# Patient Record
Sex: MALE | Race: BLACK OR AFRICAN AMERICAN | NOT HISPANIC OR LATINO | Employment: FULL TIME | URBAN - METROPOLITAN AREA
[De-identification: names, ages, dates, MRNs, and addresses within clinical notes are randomized per-mention and may not be internally consistent; named-entity substitution may affect disease eponyms.]

---

## 2017-01-05 ENCOUNTER — GENERIC CONVERSION - ENCOUNTER (OUTPATIENT)
Dept: OTHER | Facility: OTHER | Age: 52
End: 2017-01-05

## 2017-03-22 ENCOUNTER — GENERIC CONVERSION - ENCOUNTER (OUTPATIENT)
Dept: OTHER | Facility: OTHER | Age: 52
End: 2017-03-22

## 2017-05-31 ENCOUNTER — GENERIC CONVERSION - ENCOUNTER (OUTPATIENT)
Dept: OTHER | Facility: OTHER | Age: 52
End: 2017-05-31

## 2017-09-13 ENCOUNTER — GENERIC CONVERSION - ENCOUNTER (OUTPATIENT)
Dept: OTHER | Facility: OTHER | Age: 52
End: 2017-09-13

## 2017-10-05 ENCOUNTER — TRANSCRIBE ORDERS (OUTPATIENT)
Dept: ADMINISTRATIVE | Facility: HOSPITAL | Age: 52
End: 2017-10-05

## 2017-10-05 DIAGNOSIS — F42.2 MIXED OBSESSIONAL THOUGHTS AND ACTS: ICD-10-CM

## 2017-10-05 DIAGNOSIS — R51.9 NONINTRACTABLE HEADACHE, UNSPECIFIED CHRONICITY PATTERN, UNSPECIFIED HEADACHE TYPE: Primary | ICD-10-CM

## 2017-10-18 ENCOUNTER — HOSPITAL ENCOUNTER (OUTPATIENT)
Dept: RADIOLOGY | Facility: HOSPITAL | Age: 52
Discharge: HOME | End: 2017-10-18
Attending: INTERNAL MEDICINE
Payer: COMMERCIAL

## 2017-10-18 DIAGNOSIS — R51.9 NONINTRACTABLE HEADACHE, UNSPECIFIED CHRONICITY PATTERN, UNSPECIFIED HEADACHE TYPE: ICD-10-CM

## 2017-10-18 DIAGNOSIS — F42.2 MIXED OBSESSIONAL THOUGHTS AND ACTS: ICD-10-CM

## 2017-10-18 PROCEDURE — 70551 MRI BRAIN STEM W/O DYE: CPT

## 2018-11-13 ENCOUNTER — HOSPITAL ENCOUNTER (EMERGENCY)
Facility: HOSPITAL | Age: 53
Discharge: HOME | End: 2018-11-13
Attending: EMERGENCY MEDICINE | Admitting: EMERGENCY MEDICINE
Payer: OTHER MISCELLANEOUS

## 2018-11-13 ENCOUNTER — APPOINTMENT (EMERGENCY)
Dept: RADIOLOGY | Facility: HOSPITAL | Age: 53
End: 2018-11-13
Payer: OTHER MISCELLANEOUS

## 2018-11-13 VITALS
SYSTOLIC BLOOD PRESSURE: 146 MMHG | DIASTOLIC BLOOD PRESSURE: 92 MMHG | HEART RATE: 80 BPM | HEIGHT: 68 IN | RESPIRATION RATE: 18 BRPM | OXYGEN SATURATION: 100 % | TEMPERATURE: 98.8 F | BODY MASS INDEX: 22.73 KG/M2 | WEIGHT: 150 LBS

## 2018-11-13 DIAGNOSIS — S09.90XA HEAD INJURY: Primary | ICD-10-CM

## 2018-11-13 PROCEDURE — 70450 CT HEAD/BRAIN W/O DYE: CPT

## 2018-11-13 PROCEDURE — 99283 EMERGENCY DEPT VISIT LOW MDM: CPT

## 2018-11-13 NOTE — ED PROVIDER NOTES
History  Chief Complaint   Patient presents with    Head Injury     States he was bending over and stood up striking head on towel dispenser at work on 11/11  No LOC  Has headache, did not take Tylenol/Motrin     Patient is a 78-year-old male that is presenting to the emergency room with complaint of intermittent headaches and strange feeling on top of his head, patient was at work with bending over stood up and hit his head on a towel rack 3 days ago, patient denies any loss consciousness at that time, the patient has had no double vision or difficulty walking  Patient has not taken any medications to control his headaches at this point  Patient states he has a family member that had a similar episode in the past that turned out that he had bleeding on his brain  The patient is anxious in because his workman's comp case he decided come to the emergency room and get evaluated  Patient states the pain is more of an ache with some tingling sensations around the scalp  Patient did not have any large hematoma after the injury and there was no lacerations or bleeding            Prior to Admission Medications   Prescriptions Last Dose Informant Patient Reported? Taking? UNKNOWN TO PATIENT  Self Yes Yes   Sig: Thyroid pill and stomach ulcer pill      Facility-Administered Medications: None       Past Medical History:   Diagnosis Date    Disease of thyroid gland     Stomach ulcer        History reviewed  No pertinent surgical history  History reviewed  No pertinent family history  I have reviewed and agree with the history as documented  Social History   Substance Use Topics    Smoking status: Never Smoker    Smokeless tobacco: Never Used    Alcohol use No        Review of Systems   Constitutional: Negative for chills and fever  HENT: Negative for facial swelling and trouble swallowing  Eyes: Negative for photophobia and visual disturbance     Respiratory: Negative for chest tightness and shortness of breath  Cardiovascular: Negative for chest pain and leg swelling  Gastrointestinal: Negative for abdominal pain, nausea and vomiting  Genitourinary: Negative for dysuria and flank pain  Musculoskeletal: Negative for back pain and neck pain  Skin: Negative  Neurological: Positive for headaches  Negative for speech difficulty, weakness and numbness  Hematological: Negative  Psychiatric/Behavioral: Negative  Physical Exam  Physical Exam   Constitutional: He is oriented to person, place, and time  He appears well-developed and well-nourished  HENT:   Head: Normocephalic and atraumatic  Head is without abrasion, without contusion and without laceration  Eyes: Pupils are equal, round, and reactive to light  EOM are normal    Neck: Normal range of motion  Neck supple  Cardiovascular: Normal rate and regular rhythm  Pulmonary/Chest: Effort normal and breath sounds normal    Abdominal: Soft  Bowel sounds are normal  He exhibits no distension  There is no tenderness  Musculoskeletal: Normal range of motion  He exhibits no edema  Neurological: He is alert and oriented to person, place, and time  No cranial nerve deficit  Skin: Skin is warm and dry  Nursing note and vitals reviewed  Vital Signs  ED Triage Vitals [11/13/18 1810]   Temperature Pulse Respirations Blood Pressure SpO2   98 8 °F (37 1 °C) 80 18 146/92 100 %      Temp Source Heart Rate Source Patient Position - Orthostatic VS BP Location FiO2 (%)   Oral Monitor Sitting Left arm --      Pain Score       7           Vitals:    11/13/18 1810   BP: 146/92   Pulse: 80   Patient Position - Orthostatic VS: Sitting       Visual Acuity      ED Medications  Medications - No data to display    Diagnostic Studies  Results Reviewed     None                 CT head without contrast   Final Result by Isaac Cantu DO (11/13 1901)      No acute intracranial abnormality                    Workstation performed: [id number] Procedures  Procedures       Phone Contacts  ED Phone Contact    ED Course                               MDM  Number of Diagnoses or Management Options  Head injury:   Diagnosis management comments: Patient's CT scan showed no acute abnormality, we discussed minor head injuries and the fact that if he has continued symptoms he should follow up with primary care doctor and possibly go see a concussion specialist   Patient states understanding is in agreement the assessment plan  CritCare Time    Disposition  Final diagnoses:   Head injury     Time reflects when diagnosis was documented in both MDM as applicable and the Disposition within this note     Time User Action Codes Description Comment    11/13/2018  7:31 PM Ilia Ruiz Add [S09 [id number]] Head injury       ED Disposition     ED Disposition Condition Comment    Discharge  Lore Fontaine discharge to home/self care  Condition at discharge: Stable        Follow-up Information     Follow up With Specialties Details Why Contact Info    Elvira Castelan MD Internal Medicine Schedule an appointment as soon as possible for a visit As needed [address]  [phone]            Patient's Medications   Discharge Prescriptions    No medications on file     No discharge procedures on file      ED Provider  Electronically Signed by           Soraida Rivera MD  11/13/18 8587

## 2018-11-14 NOTE — DISCHARGE INSTRUCTIONS

## 2019-01-01 ENCOUNTER — APPOINTMENT (EMERGENCY)
Dept: RADIOLOGY | Facility: HOSPITAL | Age: 54
End: 2019-01-01
Payer: COMMERCIAL

## 2019-01-01 ENCOUNTER — HOSPITAL ENCOUNTER (EMERGENCY)
Facility: HOSPITAL | Age: 54
Discharge: HOME | End: 2019-01-01
Attending: EMERGENCY MEDICINE | Admitting: EMERGENCY MEDICINE
Payer: COMMERCIAL

## 2019-01-01 VITALS
TEMPERATURE: 98 F | SYSTOLIC BLOOD PRESSURE: 126 MMHG | HEART RATE: 55 BPM | WEIGHT: 156 LBS | OXYGEN SATURATION: 98 % | BODY MASS INDEX: 23.72 KG/M2 | RESPIRATION RATE: 18 BRPM | DIASTOLIC BLOOD PRESSURE: 78 MMHG

## 2019-01-01 DIAGNOSIS — R07.9 CHEST PAIN: Primary | ICD-10-CM

## 2019-01-01 LAB
ALBUMIN SERPL BCP-MCNC: 3.7 G/DL (ref 3.5–5)
ALP SERPL-CCNC: 80 U/L (ref 46–116)
ALT SERPL W P-5'-P-CCNC: 24 U/L (ref 12–78)
ANION GAP SERPL CALCULATED.3IONS-SCNC: 10 MMOL/L (ref 4–13)
APTT PPP: 31 SECONDS (ref 24–33)
AST SERPL W P-5'-P-CCNC: 25 U/L (ref 5–45)
BASOPHILS # BLD AUTO: 0.04 THOUSANDS/ΜL (ref 0–0.1)
BASOPHILS NFR BLD AUTO: 1 % (ref 0–1)
BILIRUB SERPL-MCNC: 0.3 MG/DL (ref 0.2–1)
BUN SERPL-MCNC: 10 MG/DL (ref 5–25)
CALCIUM SERPL-MCNC: 8.6 MG/DL (ref 8.3–10.1)
CHLORIDE SERPL-SCNC: 103 MMOL/L (ref 100–108)
CO2 SERPL-SCNC: 28 MMOL/L (ref 21–32)
CREAT SERPL-MCNC: 0.91 MG/DL (ref 0.6–1.3)
EOSINOPHIL # BLD AUTO: 0.09 THOUSAND/ΜL (ref 0–0.61)
EOSINOPHIL NFR BLD AUTO: 2 % (ref 0–6)
ERYTHROCYTE [DISTWIDTH] IN BLOOD BY AUTOMATED COUNT: 12.3 % (ref 11.6–15.1)
GFR SERPL CREATININE-BSD FRML MDRD: 109 ML/MIN/1.73SQ M
GLUCOSE SERPL-MCNC: 83 MG/DL (ref 65–140)
HCT VFR BLD AUTO: 39.3 % (ref 36.5–49.3)
HGB BLD-MCNC: 12.9 G/DL (ref 12–17)
IMM GRANULOCYTES # BLD AUTO: 0.01 THOUSAND/UL (ref 0–0.2)
IMM GRANULOCYTES NFR BLD AUTO: 0 % (ref 0–2)
INR PPP: 0.99 (ref 0.86–1.16)
LYMPHOCYTES # BLD AUTO: 1.56 THOUSANDS/ΜL (ref 0.6–4.47)
LYMPHOCYTES NFR BLD AUTO: 30 % (ref 14–44)
MCH RBC QN AUTO: 29.8 PG (ref 26.8–34.3)
MCHC RBC AUTO-ENTMCNC: 32.8 G/DL (ref 31.4–37.4)
MCV RBC AUTO: 91 FL (ref 82–98)
MONOCYTES # BLD AUTO: 0.54 THOUSAND/ΜL (ref 0.17–1.22)
MONOCYTES NFR BLD AUTO: 11 % (ref 4–12)
NEUTROPHILS # BLD AUTO: 2.9 THOUSANDS/ΜL (ref 1.85–7.62)
NEUTS SEG NFR BLD AUTO: 56 % (ref 43–75)
NRBC BLD AUTO-RTO: 0 /100 WBCS
PLATELET # BLD AUTO: 268 THOUSANDS/UL (ref 149–390)
PMV BLD AUTO: 10.5 FL (ref 8.9–12.7)
POTASSIUM SERPL-SCNC: 4 MMOL/L (ref 3.5–5.3)
PROT SERPL-MCNC: 7.2 G/DL (ref 6.4–8.2)
PROTHROMBIN TIME: 10.4 SECONDS (ref 9.4–11.7)
RBC # BLD AUTO: 4.33 MILLION/UL (ref 3.88–5.62)
SODIUM SERPL-SCNC: 141 MMOL/L (ref 136–145)
TROPONIN I SERPL-MCNC: <0.02 NG/ML
TROPONIN I SERPL-MCNC: <0.02 NG/ML
TSH SERPL DL<=0.05 MIU/L-ACNC: 0.8 UIU/ML (ref 0.36–3.74)
WBC # BLD AUTO: 5.14 THOUSAND/UL (ref 4.31–10.16)

## 2019-01-01 PROCEDURE — 84443 ASSAY THYROID STIM HORMONE: CPT | Performed by: EMERGENCY MEDICINE

## 2019-01-01 PROCEDURE — 71045 X-RAY EXAM CHEST 1 VIEW: CPT

## 2019-01-01 PROCEDURE — 85610 PROTHROMBIN TIME: CPT | Performed by: EMERGENCY MEDICINE

## 2019-01-01 PROCEDURE — 36415 COLL VENOUS BLD VENIPUNCTURE: CPT | Performed by: EMERGENCY MEDICINE

## 2019-01-01 PROCEDURE — 99285 EMERGENCY DEPT VISIT HI MDM: CPT

## 2019-01-01 PROCEDURE — 85025 COMPLETE CBC W/AUTO DIFF WBC: CPT | Performed by: EMERGENCY MEDICINE

## 2019-01-01 PROCEDURE — 84484 ASSAY OF TROPONIN QUANT: CPT | Performed by: EMERGENCY MEDICINE

## 2019-01-01 PROCEDURE — 85730 THROMBOPLASTIN TIME PARTIAL: CPT | Performed by: EMERGENCY MEDICINE

## 2019-01-01 PROCEDURE — 93005 ELECTROCARDIOGRAM TRACING: CPT

## 2019-01-01 PROCEDURE — 80053 COMPREHEN METABOLIC PANEL: CPT | Performed by: EMERGENCY MEDICINE

## 2019-01-01 RX ORDER — LEVOTHYROXINE SODIUM 0.1 MG/1
100 TABLET ORAL DAILY
COMMUNITY

## 2019-01-01 RX ADMIN — NITROGLYCERIN 1 INCH: 20 OINTMENT TOPICAL at 16:15

## 2019-01-01 NOTE — DISCHARGE INSTRUCTIONS

## 2019-01-01 NOTE — ED PROVIDER NOTES
History  Chief Complaint   Patient presents with    Chest Pain     left sided chest pain today that comes and goes  Patient states he had some left-sided pain since early this morning  The pain occurred at rest is not associated with movement or position  He has had no cough or congestion  There is no hemoptysis  The chest pain resolved and he was able to go to work  Patient states this afternoon as he was signing at the pain returned in the same area radiating under the left breast   Is not associated with nausea diaphoresis or shortness of breath  Patient states the pain worsened as he was driving home and decided to come get checked out  Patient states as he arrived the pain has been resolving and is almost gone without treatment once again  Patient states he had an EKG in he thinks a stress test about 2 years ago under similar circumstances  Prior to Admission Medications   Prescriptions Last Dose Informant Patient Reported? Taking? UNKNOWN TO PATIENT  Self Yes No   Sig: Thyroid pill and stomach ulcer pill   levothyroxine 100 mcg tablet 1/1/2019 at Unknown time  Yes Yes   Sig: Take 100 mcg by mouth daily      Facility-Administered Medications: None       Past Medical History:   Diagnosis Date    Disease of thyroid gland     Stomach ulcer        History reviewed  No pertinent surgical history  History reviewed  No pertinent family history  I have reviewed and agree with the history as documented  Social History   Substance Use Topics    Smoking status: Never Smoker    Smokeless tobacco: Never Used    Alcohol use No        Review of Systems   Constitutional: Negative for fever  HENT: Negative for congestion and sore throat  Respiratory: Negative for cough, shortness of breath and wheezing  Cardiovascular: Positive for chest pain  Negative for palpitations and leg swelling  Gastrointestinal: Negative for abdominal pain and vomiting     Genitourinary: Negative for dysuria and flank pain  Musculoskeletal: Negative for arthralgias and back pain  Skin: Negative for rash and wound  Neurological: Negative for headaches  Hematological: Does not bruise/bleed easily  Psychiatric/Behavioral: Negative for confusion  All other systems reviewed and are negative  Physical Exam  Physical Exam   Constitutional: He is oriented to person, place, and time  He appears well-developed and well-nourished  HENT:   Head: Normocephalic and atraumatic  Mouth/Throat: Oropharynx is clear and moist    Eyes: Conjunctivae are normal    Patient has Esotropion   Neck: Normal range of motion  Neck supple  Cardiovascular: Normal rate, regular rhythm and normal heart sounds  Pulmonary/Chest: Effort normal and breath sounds normal  He exhibits no tenderness  Abdominal: Soft  Bowel sounds are normal  There is no guarding  Musculoskeletal: Normal range of motion  He exhibits no edema or tenderness  Neurological: He is alert and oriented to person, place, and time  Skin: Skin is warm and dry  Psychiatric: He has a normal mood and affect  His behavior is normal    Nursing note and vitals reviewed        Vital Signs  ED Triage Vitals [01/01/19 1548]   Temperature Pulse Respirations Blood Pressure SpO2   98 °F (36 7 °C) 77 18 162/94 100 %      Temp src Heart Rate Source Patient Position - Orthostatic VS BP Location FiO2 (%)   -- -- -- -- --      Pain Score       --           Vitals:    01/01/19 1548   BP: 162/94   Pulse: 77       Visual Acuity      ED Medications  Medications - No data to display    Diagnostic Studies  Results Reviewed     None                 No orders to display              Procedures  ECG 12 Lead Documentation  Date/Time: 1/1/2019 3:57 PM  Performed by: Beto Broussard  Authorized by: Beto Broussard     Indications / Diagnosis:  Chest pain  ECG reviewed by me, the ED Provider: yes    Patient location:  ED  Interpretation:     Interpretation: non-specific    Rate: ECG rate:  72    ECG rate assessment: normal    Rhythm:     Rhythm: sinus rhythm    Ectopy:     Ectopy: none    QRS:     QRS axis:  Normal    QRS intervals:  Normal  Conduction:     Conduction: normal    ST segments:     ST segments:  Non-specific  T waves:     T waves: inverted      Inverted:  III  Other findings:     Other findings: LVH    Comments:      EKG was repeated and a prior EKG was printed out for comparison  No changes noted           Phone Contacts  ED Phone Contact    ED Course                               [facility]    CritCare Time    Disposition  Final diagnoses:   None     ED Disposition     None      Follow-up Information    None         Patient's Medications   Discharge Prescriptions    No medications on file     No discharge procedures on file      ED Provider  Electronically Signed by           Jacob Galo MD  01/01/19 4256

## 2019-01-02 LAB
ATRIAL RATE: 65 BPM
ATRIAL RATE: 72 BPM
P AXIS: 51 DEGREES
P AXIS: 51 DEGREES
PR INTERVAL: 138 MS
PR INTERVAL: 140 MS
QRS AXIS: -10 DEGREES
QRS AXIS: -5 DEGREES
QRSD INTERVAL: 98 MS
QRSD INTERVAL: 98 MS
QT INTERVAL: 354 MS
QT INTERVAL: 384 MS
QTC INTERVAL: 387 MS
QTC INTERVAL: 399 MS
T WAVE AXIS: 10 DEGREES
T WAVE AXIS: 3 DEGREES
VENTRICULAR RATE: 65 BPM
VENTRICULAR RATE: 72 BPM

## 2019-01-02 PROCEDURE — 93010 ELECTROCARDIOGRAM REPORT: CPT | Performed by: INTERNAL MEDICINE

## 2019-06-18 ENCOUNTER — APPOINTMENT (OUTPATIENT)
Dept: RADIOLOGY | Facility: CLINIC | Age: 54
End: 2019-06-18

## 2019-06-18 DIAGNOSIS — M25.561 ACUTE PAIN OF RIGHT KNEE: ICD-10-CM

## 2019-06-18 PROCEDURE — 73564 X-RAY EXAM KNEE 4 OR MORE: CPT

## 2020-03-23 ENCOUNTER — TELEPHONE (OUTPATIENT)
Dept: FAMILY MEDICINE CLINIC | Facility: CLINIC | Age: 55
End: 2020-03-23

## 2020-03-23 NOTE — TELEPHONE ENCOUNTER
Do you have a fever of 100 5 and above?:no  Do you have a cough?:no  Do you have shortness of breath?  Heavy breath     Have you visited [place]?:no  [place]?:no  [place]?:no  [place]?:no  [place]?:no    Have you visited [place]?:no  [place]?:no  [place]?:no    Have you visited [address] [place] [address] State:?no  [address] [place] [address]?:no  [facility]?:no    Have you been exposed to someone who has been diagnosed with coronavirus?:no    Do you commute daily to and from [place] or [place] for work?:no    Only for std test

## 2023-01-13 ENCOUNTER — OFFICE VISIT (OUTPATIENT)
Dept: FAMILY MEDICINE CLINIC | Facility: CLINIC | Age: 58
End: 2023-01-13

## 2023-01-13 VITALS
RESPIRATION RATE: 16 BRPM | HEIGHT: 66 IN | DIASTOLIC BLOOD PRESSURE: 76 MMHG | WEIGHT: 169 LBS | TEMPERATURE: 97.5 F | SYSTOLIC BLOOD PRESSURE: 130 MMHG | BODY MASS INDEX: 27.16 KG/M2 | OXYGEN SATURATION: 98 % | HEART RATE: 80 BPM

## 2023-01-13 DIAGNOSIS — Z11.59 NEED FOR HEPATITIS C SCREENING TEST: ICD-10-CM

## 2023-01-13 DIAGNOSIS — M75.82 TENDINITIS OF LEFT ROTATOR CUFF: ICD-10-CM

## 2023-01-13 DIAGNOSIS — I10 PRIMARY HYPERTENSION: ICD-10-CM

## 2023-01-13 DIAGNOSIS — E89.0 POSTOPERATIVE HYPOTHYROIDISM: ICD-10-CM

## 2023-01-13 DIAGNOSIS — L20.84 INTRINSIC ECZEMA: ICD-10-CM

## 2023-01-13 DIAGNOSIS — Z12.11 SCREENING FOR COLON CANCER: Primary | ICD-10-CM

## 2023-01-13 DIAGNOSIS — Z00.00 ANNUAL PHYSICAL EXAM: ICD-10-CM

## 2023-01-13 PROBLEM — K21.9 GASTROESOPHAGEAL REFLUX DISEASE WITHOUT ESOPHAGITIS: Status: ACTIVE | Noted: 2023-01-13

## 2023-01-13 RX ORDER — TRIAMCINOLONE ACETONIDE 5 MG/G
CREAM TOPICAL 2 TIMES DAILY
Qty: 30 G | Refills: 0 | Status: SHIPPED | OUTPATIENT
Start: 2023-01-13

## 2023-01-13 RX ORDER — AMLODIPINE BESYLATE 5 MG/1
5 TABLET ORAL DAILY
COMMUNITY
Start: 2022-11-04

## 2023-01-13 RX ORDER — OMEPRAZOLE 40 MG/1
CAPSULE, DELAYED RELEASE ORAL
COMMUNITY
Start: 2022-12-11

## 2023-01-13 NOTE — PROGRESS NOTES
Name: Rafael Veliz      : 1965      MRN: [id number]  Encounter Provider: Shayla Chester MD  Encounter Date: 2023   Encounter department: [address] [place] [address] Dr MEDICINE    Assessment & Plan     1  Screening for colon cancer  -     Ambulatory referral for colonoscopy; Future    2  Need for hepatitis C screening test  -     Hepatitis C Antibody ([facility]); Future    3  Annual physical exam  Assessment & Plan:  Check routine labs      Orders:  -     CBC and differential; Future  -     Comprehensive metabolic panel; Future; Expected date: 2023  -     Lipid panel; Future; Expected date: 2023  -     Urinalysis with microscopic  -     PSA, Total Screen; Future    4  Tendinitis of left rotator cuff  Assessment & Plan:  Already seen [facility] everywhere declined cortisone shot and PT  Will go for PT now    Orders:  -     Ambulatory Referral to Physical Therapy; Future    5  Postoperative hypothyroidism  Assessment & Plan:  Thyroidectomy  hyperactive    Orders:  -     TSH, 3rd generation; Future    6  Primary hypertension  Assessment & Plan:  Well controlled on current therapy continue with current medications and will reassess next visit        7  Intrinsic eczema  Assessment & Plan:  Triamcinolone cream bid  prn    Orders:  -     triamcinolone (KENALOG) 0 5 % cream; Apply topically 2 (two) times a day           Subjective      HPI  New pt here for  Initial visit and due for physical has problems with left  Shoulder pain pt had xray and evaluation  dr Zamora Lot ][facility] declined cortisone shot and therapy  Review of Systems   Constitutional: Negative for appetite change, chills, fatigue and fever  Eyes:        Right eye  Only shadow due to trauma    Respiratory: Negative for cough, chest tightness and shortness of breath  Cardiovascular: Negative for chest pain, palpitations and leg swelling     Gastrointestinal: Negative for abdominal pain, constipation, diarrhea, nausea and vomiting  Genitourinary: Negative for difficulty urinating and frequency  Musculoskeletal: Positive for arthralgias (shoulder left)  Negative for back pain and neck pain  Skin: Positive for rash (sternum area itchy)  Neurological: Negative for dizziness, weakness, light-headedness, numbness and headaches  Hematological: Does not bruise/bleed easily  Psychiatric/Behavioral: Negative for dysphoric mood and sleep disturbance  The patient is not nervous/anxious  Current Outpatient Medications on File Prior to Visit   Medication Sig   • amLODIPine (NORVASC) 5 mg tablet Take 5 mg by mouth daily   • Cholecalciferol 10 MCG (400 UNIT) CAPS Take 400 Units by mouth daily   • levothyroxine 100 mcg tablet Take 100 mcg by mouth daily Is taking 88 mcg daily   • omeprazole (PriLOSEC) 40 MG capsule    • UNKNOWN TO PATIENT Thyroid pill and stomach ulcer pill (Patient not taking: Reported on 1/13/2023)       Objective     /76 (BP Location: Left arm, Patient Position: Sitting, Cuff Size: Standard)   Pulse 80   Temp 97 5 °F (36 4 °C) (Tympanic)   Resp 16   Ht 5' 6" (1 676 m)   Wt 76 7 kg (169 lb)   SpO2 98%   BMI 27 28 kg/m²     Physical Exam  Constitutional:       General: He is not in acute distress  Appearance: Normal appearance  He is not ill-appearing  HENT:      Right Ear: Tympanic membrane and external ear normal       Left Ear: Tympanic membrane and external ear normal       Nose: Nose normal       Mouth/Throat:      Mouth: Mucous membranes are moist    Eyes:      General:         Right eye: No discharge  Left eye: No discharge  Extraocular Movements: Extraocular movements intact  Conjunctiva/sclera: Conjunctivae normal       Pupils: Pupils are equal, round, and reactive to light  Neck:      Thyroid: No thyromegaly  Vascular: No carotid bruit  Trachea: No tracheal deviation  Cardiovascular:      Rate and Rhythm: Normal rate and regular rhythm  Pulses: Normal pulses  Heart sounds: Normal heart sounds  No murmur heard  Pulmonary:      Effort: Pulmonary effort is normal       Breath sounds: Normal breath sounds  No wheezing or rales  Chest:      Chest wall: No tenderness  Breasts:     Right: Normal       Left: Normal    Abdominal:      General: Bowel sounds are normal  There is no distension  Palpations: Abdomen is soft  There is no mass  Tenderness: There is no abdominal tenderness  Hernia: No hernia is present  There is no hernia in the left inguinal area  Genitourinary:     Penis: Normal        Testes: Normal       Prostate: Normal       Rectum: Normal    Musculoskeletal:      Cervical back: Normal range of motion and neck supple  Right lower leg: No edema  Left lower leg: No edema  Comments: Left shoulder decreased ROM    Lymphadenopathy:      Cervical: No cervical adenopathy  Lower Body: No right inguinal adenopathy  No left inguinal adenopathy  Skin:     General: Skin is warm and dry  Findings: Rash (low midsternum hyperpigmented area some flakiness) present  Comments: No abn moles   Neurological:      General: No focal deficit present  Mental Status: He is alert and oriented to person, place, and time  Mental status is at baseline  Cranial Nerves: No cranial nerve deficit  Sensory: No sensory deficit  Motor: No weakness or abnormal muscle tone  Coordination: Coordination normal       Gait: Gait normal       Deep Tendon Reflexes: Reflexes normal    Psychiatric:         Mood and Affect: Mood normal          Behavior: Behavior normal      BMI Counseling: Body mass index is 27 28 kg/m²  The BMI is above normal  Nutrition recommendations include 3-5 servings of fruits/vegetables daily, reducing fast food intake, consuming healthier snacks, decreasing soda and/or juice intake, moderation in carbohydrate intake and increasing intake of lean protein   Exercise recommendations include exercising 3-5 times per week and strength training exercises    Shayla Chester MD

## 2023-01-26 DIAGNOSIS — L20.84 INTRINSIC ECZEMA: ICD-10-CM

## 2023-01-27 RX ORDER — TRIAMCINOLONE ACETONIDE 5 MG/G
CREAM TOPICAL
Qty: 30 G | Refills: 0 | Status: SHIPPED | OUTPATIENT
Start: 2023-01-27

## 2023-01-28 LAB
ALBUMIN SERPL-MCNC: 4.4 G/DL (ref 3.8–4.9)
ALBUMIN/GLOB SERPL: 1.6 {RATIO} (ref 1.2–2.2)
ALP SERPL-CCNC: 80 IU/L (ref 44–121)
ALT SERPL-CCNC: 26 IU/L (ref 0–44)
AST SERPL-CCNC: 27 IU/L (ref 0–40)
BASOPHILS # BLD AUTO: 0.1 X10E3/UL (ref 0–0.2)
BASOPHILS NFR BLD AUTO: 1 %
BILIRUB SERPL-MCNC: 0.3 MG/DL (ref 0–1.2)
BUN SERPL-MCNC: 9 MG/DL (ref 6–24)
BUN/CREAT SERPL: 10 (ref 9–20)
CALCIUM SERPL-MCNC: 9.3 MG/DL (ref 8.7–10.2)
CHLORIDE SERPL-SCNC: 102 MMOL/L (ref 96–106)
CHOLEST SERPL-MCNC: 189 MG/DL (ref 100–199)
CO2 SERPL-SCNC: 28 MMOL/L (ref 20–29)
CREAT SERPL-MCNC: 0.9 MG/DL (ref 0.76–1.27)
EGFR: 98 ML/MIN/1.73
EOSINOPHIL # BLD AUTO: 0.1 X10E3/UL (ref 0–0.4)
EOSINOPHIL NFR BLD AUTO: 2 %
ERYTHROCYTE [DISTWIDTH] IN BLOOD BY AUTOMATED COUNT: 13.7 % (ref 11.6–15.4)
GLOBULIN SER-MCNC: 2.7 G/DL (ref 1.5–4.5)
GLUCOSE SERPL-MCNC: 80 MG/DL (ref 70–99)
HCT VFR BLD AUTO: 38.5 % (ref 37.5–51)
HCV AB S/CO SERPL IA: 0.1 S/CO RATIO (ref 0–0.9)
HDLC SERPL-MCNC: 57 MG/DL
HGB BLD-MCNC: 12.9 G/DL (ref 13–17.7)
IMM GRANULOCYTES # BLD: 0 X10E3/UL (ref 0–0.1)
IMM GRANULOCYTES NFR BLD: 0 %
LDLC SERPL CALC-MCNC: 121 MG/DL (ref 0–99)
LYMPHOCYTES # BLD AUTO: 1.5 X10E3/UL (ref 0.7–3.1)
LYMPHOCYTES NFR BLD AUTO: 33 %
MCH RBC QN AUTO: 29.8 PG (ref 26.6–33)
MCHC RBC AUTO-ENTMCNC: 33.5 G/DL (ref 31.5–35.7)
MCV RBC AUTO: 89 FL (ref 79–97)
MONOCYTES # BLD AUTO: 0.4 X10E3/UL (ref 0.1–0.9)
MONOCYTES NFR BLD AUTO: 9 %
NEUTROPHILS # BLD AUTO: 2.4 X10E3/UL (ref 1.4–7)
NEUTROPHILS NFR BLD AUTO: 55 %
PLATELET # BLD AUTO: 301 X10E3/UL (ref 150–450)
POTASSIUM SERPL-SCNC: 4.4 MMOL/L (ref 3.5–5.2)
PROT SERPL-MCNC: 7.1 G/DL (ref 6–8.5)
PSA SERPL-MCNC: 1.8 NG/ML (ref 0–4)
RBC # BLD AUTO: 4.33 X10E6/UL (ref 4.14–5.8)
SL AMB VLDL CHOLESTEROL CALC: 11 MG/DL (ref 5–40)
SODIUM SERPL-SCNC: 141 MMOL/L (ref 134–144)
TRIGL SERPL-MCNC: 58 MG/DL (ref 0–149)
TSH SERPL DL<=0.005 MIU/L-ACNC: 12.5 UIU/ML (ref 0.45–4.5)
WBC # BLD AUTO: 4.4 X10E3/UL (ref 3.4–10.8)

## 2023-01-31 DIAGNOSIS — D64.9 ANEMIA, UNSPECIFIED TYPE: ICD-10-CM

## 2023-01-31 DIAGNOSIS — E03.8 OTHER SPECIFIED HYPOTHYROIDISM: Primary | ICD-10-CM

## 2023-01-31 RX ORDER — LEVOTHYROXINE SODIUM 0.05 MG/1
50 TABLET ORAL DAILY
Qty: 90 TABLET | Refills: 3 | Status: SHIPPED | OUTPATIENT
Start: 2023-01-31

## 2023-02-02 ENCOUNTER — TELEPHONE (OUTPATIENT)
Dept: OTHER | Facility: OTHER | Age: 58
End: 2023-02-02

## 2023-02-02 NOTE — TELEPHONE ENCOUNTER
Patient reports that he is returning a missed call from [facility]  Reviewed chart and cannot find any information on a missed call but do see a referral was placed in his chart by his PCP on 1/31/2023  Please follow-up with patient; thank you!

## 2023-02-03 NOTE — TELEPHONE ENCOUNTER
There were two referrals from Dr Siva Medrano in his chart, one for colonoscopy which he was overdue from November with Dr Abdirahman Salazar and the other for anemia  Made an appointment with Dr Abdirahman Salazar in case he needs EGD as well as colonoscopy

## 2023-02-06 DIAGNOSIS — I10 PRIMARY HYPERTENSION: Primary | ICD-10-CM

## 2023-02-06 NOTE — TELEPHONE ENCOUNTER
Jaclyn Goodwin said he is "getting the run around from [facility] for refill of blood pressure medication Amlodipine 5mg"  Needs this refilled to [facility]

## 2023-02-07 RX ORDER — AMLODIPINE BESYLATE 5 MG/1
5 TABLET ORAL DAILY
Qty: 30 TABLET | Refills: 2 | Status: SHIPPED | OUTPATIENT
Start: 2023-02-07

## 2023-03-08 ENCOUNTER — TELEPHONE (OUTPATIENT)
Dept: FAMILY MEDICINE CLINIC | Facility: CLINIC | Age: 58
End: 2023-03-08

## 2023-03-08 NOTE — TELEPHONE ENCOUNTER
Patient called to schedule his 4 mon f/u  He also mentioned that he feels like his Iron pill is giving him a reaction  "itching", no rash or other symptoms  He can be reached after 3:30 today

## 2023-03-08 NOTE — TELEPHONE ENCOUNTER
It's possible I guess he was started on some iron secondary to a mild anemia, so it's possible-I guess he can take claritin or benadryl as needed for iitching but may ultimately have to go on a different iron pill or get it in his diet instead Primary Defect Width (In Cm): 0

## 2023-03-09 NOTE — TELEPHONE ENCOUNTER
Patient aware-wants to know what Dr Peri Sapp thinks- he wanted to book an appointment to discuss but nothing soon  Should he take another iron pill?  The itching is coming and going  He is aware he wont get a call back until Dr Peri Sapp gets back on Monday

## 2023-03-16 ENCOUNTER — RA CDI HCC (OUTPATIENT)
Dept: OTHER | Facility: HOSPITAL | Age: 58
End: 2023-03-16

## 2023-03-16 ENCOUNTER — APPOINTMENT (EMERGENCY)
Dept: CT IMAGING | Facility: HOSPITAL | Age: 58
End: 2023-03-16

## 2023-03-16 ENCOUNTER — HOSPITAL ENCOUNTER (EMERGENCY)
Facility: HOSPITAL | Age: 58
Discharge: HOME | End: 2023-03-16
Attending: EMERGENCY MEDICINE

## 2023-03-16 VITALS
SYSTOLIC BLOOD PRESSURE: 153 MMHG | RESPIRATION RATE: 16 BRPM | HEART RATE: 81 BPM | TEMPERATURE: 97.4 F | DIASTOLIC BLOOD PRESSURE: 88 MMHG | OXYGEN SATURATION: 100 %

## 2023-03-16 DIAGNOSIS — S05.02XA ABRASION OF LEFT CORNEA, INITIAL ENCOUNTER: ICD-10-CM
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
## 2023-03-16 DIAGNOSIS — S00.81XA ABRASION OF FACE, INITIAL ENCOUNTER: ICD-10-CM

## 2023-03-16 RX ORDER — CIPROFLOXACIN HYDROCHLORIDE 3.5 MG/ML
2 SOLUTION/ DROPS TOPICAL 4 TIMES DAILY
Qty: 2 ML | Refills: 0 | Status: SHIPPED | OUTPATIENT
Start: 2023-03-16 | End: 2023-03-21

## 2023-03-16 RX ORDER — TETRACAINE HYDROCHLORIDE 5 MG/ML
1 SOLUTION OPHTHALMIC ONCE
Status: COMPLETED | OUTPATIENT
Start: 2023-03-16 | End: 2023-03-16

## 2023-03-16 RX ORDER — CIPROFLOXACIN HYDROCHLORIDE 3.5 MG/ML
2 SOLUTION/ DROPS TOPICAL ONCE
Status: DISCONTINUED | OUTPATIENT
Start: 2023-03-16 | End: 2023-03-16

## 2023-03-16 RX ADMIN — TETRACAINE HYDROCHLORIDE 1 DROP: 5 SOLUTION OPHTHALMIC at 08:46

## 2023-03-16 RX ADMIN — CIPROFLOXACIN HYDROCHLORIDE 1 INCH: 3 OINTMENT OPHTHALMIC at 10:38

## 2023-03-16 RX ADMIN — FLUORESCEIN SODIUM 1 STRIP: 0.6 STRIP OPHTHALMIC at 08:46

## 2023-03-16 NOTE — PROGRESS NOTES
[facility]  coding opportunities       Chart reviewed, no opportunity found: CHART REVIEWED, NO OPPORTUNITY FOUND        Patients Insurance        Commercial Insurance: Jessi Xiao

## 2023-03-16 NOTE — Clinical Note
Grayson Boyce was seen and treated in our emergency department on 3/16/2023  Diagnosis:     Romero Amezquita  may return to work on return date  He may return on this date: 03/17/2023         If you have any questions or concerns, please don't hesitate to call        Sarah Hayes MD    ______________________________           _______________          _______________  Hospital Representative                              Date                                Time
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
## 2023-03-23 ENCOUNTER — TELEPHONE (OUTPATIENT)
Dept: FAMILY MEDICINE CLINIC | Facility: CLINIC | Age: 58
End: 2023-03-23

## 2023-03-23 PROBLEM — D64.9 ANEMIA, MILD: Status: ACTIVE | Noted: 2023-03-23

## 2023-05-03 ENCOUNTER — HOSPITAL ENCOUNTER (EMERGENCY)
Facility: HOSPITAL | Age: 58
Discharge: HOME | End: 2023-05-03
Attending: INTERNAL MEDICINE

## 2023-05-03 ENCOUNTER — APPOINTMENT (EMERGENCY)
Dept: RADIOLOGY | Facility: HOSPITAL | Age: 58
End: 2023-05-03

## 2023-05-03 VITALS
OXYGEN SATURATION: 99 % | SYSTOLIC BLOOD PRESSURE: 143 MMHG | HEART RATE: 98 BPM | DIASTOLIC BLOOD PRESSURE: 93 MMHG | TEMPERATURE: 97.8 F | RESPIRATION RATE: 18 BRPM

## 2023-05-03 DIAGNOSIS — J02.9 SORE THROAT: Primary | ICD-10-CM

## 2023-05-03 DIAGNOSIS — J06.9 URI (UPPER RESPIRATORY INFECTION): ICD-10-CM

## 2023-05-03 LAB
ANION GAP SERPL CALCULATED.3IONS-SCNC: 9 MMOL/L (ref 4–13)
BASOPHILS # BLD AUTO: 0.04 THOUSANDS/ÂΜL (ref 0–0.1)
BASOPHILS NFR BLD AUTO: 1 % (ref 0–1)
BUN SERPL-MCNC: 12 MG/DL (ref 5–25)
CALCIUM SERPL-MCNC: 8.8 MG/DL (ref 8.4–10.2)
CHLORIDE SERPL-SCNC: 102 MMOL/L (ref 96–108)
CO2 SERPL-SCNC: 24 MMOL/L (ref 21–32)
CREAT SERPL-MCNC: 0.86 MG/DL (ref 0.6–1.3)
EOSINOPHIL # BLD AUTO: 0.12 THOUSAND/ÂΜL (ref 0–0.61)
EOSINOPHIL NFR BLD AUTO: 2 % (ref 0–6)
ERYTHROCYTE [DISTWIDTH] IN BLOOD BY AUTOMATED COUNT: 12.4 % (ref 11.6–15.1)
GFR SERPL CREATININE-BSD FRML MDRD: 94 ML/MIN/1.73SQ M
GLUCOSE SERPL-MCNC: 89 MG/DL (ref 65–140)
HCT VFR BLD AUTO: 37.9 % (ref 36.5–49.3)
HGB BLD-MCNC: 12.6 G/DL (ref 12–17)
IMM GRANULOCYTES # BLD AUTO: 0.01 THOUSAND/UL (ref 0–0.2)
IMM GRANULOCYTES NFR BLD AUTO: 0 % (ref 0–2)
LYMPHOCYTES # BLD AUTO: 1.62 THOUSANDS/ÂΜL (ref 0.6–4.47)
LYMPHOCYTES NFR BLD AUTO: 22 % (ref 14–44)
MCH RBC QN AUTO: 30.4 PG (ref 26.8–34.3)
MCHC RBC AUTO-ENTMCNC: 33.2 G/DL (ref 31.4–37.4)
MCV RBC AUTO: 91 FL (ref 82–98)
MONOCYTES # BLD AUTO: 0.8 THOUSAND/ÂΜL (ref 0.17–1.22)
MONOCYTES NFR BLD AUTO: 11 % (ref 4–12)
NEUTROPHILS # BLD AUTO: 4.68 THOUSANDS/ÂΜL (ref 1.85–7.62)
NEUTS SEG NFR BLD AUTO: 64 % (ref 43–75)
NRBC BLD AUTO-RTO: 0 /100 WBCS
PLATELET # BLD AUTO: 281 THOUSANDS/UL (ref 149–390)
PMV BLD AUTO: 10.1 FL (ref 8.9–12.7)
POTASSIUM SERPL-SCNC: 3.5 MMOL/L (ref 3.5–5.3)
RBC # BLD AUTO: 4.15 MILLION/UL (ref 3.88–5.62)
S PYO DNA THROAT QL NAA+PROBE: NOT DETECTED
SODIUM SERPL-SCNC: 135 MMOL/L (ref 135–147)
WBC # BLD AUTO: 7.27 THOUSAND/UL (ref 4.31–10.16)

## 2023-05-04 NOTE — ED PROVIDER NOTES
History  Chief Complaint   Patient presents with    Sore Throat     Pt presents w/ sore throat x 2 days  Pt states today coughed up mucous that contained blood as well as when he blew his nose  Denies active or continuous bleeding  Baby Asprin daily  This is a 59years old came for having sore throat for 2 days  Patient stated that he was blowing his nose and he found blood  At the ER patient has no nasal bleeding  Patient also he is coughing the mucus  And he found blood  Patient has no fever  Patient has no SOB, CP  Patient stated that he drank over juice and he has heartburn so he took 1 tablet of omeprazole  Patient stated that he has history of stomach ulcer  Patient denies history of alcoholism or smoking  Patient came walking to the ER in no distress  Patient was told by his PMD that he has anemia  But he does remember the numbers  On rechecking the computer we find that his last hemoglobin was 12 9  Prior to Admission Medications   Prescriptions Last Dose Informant Patient Reported? Taking? Cholecalciferol 10 MCG (400 UNIT) CAPS   Yes No   Sig: Take 400 Units by mouth daily   UNKNOWN TO PATIENT   Yes No   Sig: Thyroid pill and stomach ulcer pill   Patient not taking: Reported on 1/13/2023   amLODIPine (NORVASC) 5 mg tablet   No No   Sig: Take 1 tablet (5 mg total) by mouth daily   levothyroxine (Euthyrox) 50 mcg tablet   No No   Sig: Take 1 tablet (50 mcg total) by mouth daily   omeprazole (PriLOSEC) 40 MG capsule   Yes No   triamcinolone (KENALOG) 0 5 % cream   No No   Sig: APPLY TO AFFECTED AREA TWICE A DAY      Facility-Administered Medications: None       Past Medical History:   Diagnosis Date    Disease of thyroid gland     Stomach ulcer        Past Surgical History:   Procedure Laterality Date    EYE SURGERY         Family History   Problem Relation Age of Onset    Cancer Mother      I have reviewed and agree with the history as documented      E-Cigarette/Vaping    E-Cigarette Use Never User      E-Cigarette/Vaping Substances     Social History     Tobacco Use    Smoking status: Never    Smokeless tobacco: Never   Vaping Use    Vaping Use: Never used   Substance Use Topics    Alcohol use: Yes     Comment: rarely    Drug use: No       Review of Systems   Constitutional: Negative for diaphoresis, fatigue and fever  HENT: Positive for sore throat  Negative for congestion, dental problem, ear discharge, ear pain, hearing loss, postnasal drip, rhinorrhea, sinus pressure, sinus pain, sneezing, tinnitus and trouble swallowing  Respiratory: Positive for cough  Negative for chest tightness and shortness of breath  Cardiovascular: Negative for chest pain, palpitations and leg swelling  Gastrointestinal: Negative for abdominal pain, diarrhea, nausea and vomiting  Endocrine: Negative for polydipsia, polyphagia and polyuria  Genitourinary: Negative for difficulty urinating, dysuria, flank pain and hematuria  Musculoskeletal: Negative for arthralgias, back pain, gait problem, neck pain and neck stiffness  Skin: Negative for color change, pallor and rash  Neurological: Negative for dizziness, seizures, syncope, speech difficulty, light-headedness and headaches  Hematological: Negative for adenopathy  Does not bruise/bleed easily  Psychiatric/Behavioral: Negative for agitation and behavioral problems  Physical Exam  Physical Exam  Vitals and nursing note reviewed  Constitutional:       General: He is not in acute distress  Appearance: He is well-developed  He is not ill-appearing, toxic-appearing or diaphoretic  HENT:      Head: Normocephalic and atraumatic  Right Ear: Tympanic membrane and ear canal normal       Left Ear: Tympanic membrane and ear canal normal       Nose: No congestion or rhinorrhea  Mouth/Throat:      Mouth: Mucous membranes are moist  No oral lesions        Pharynx: No pharyngeal swelling, oropharyngeal exudate, posterior oropharyngeal erythema or uvula swelling  Tonsils: No tonsillar exudate or tonsillar abscesses  0 on the right  0 on the left  Eyes:      Extraocular Movements:      Right eye: Normal extraocular motion  Left eye: Normal extraocular motion  Conjunctiva/sclera: Conjunctivae normal    Neck:      Thyroid: No thyromegaly  Cardiovascular:      Rate and Rhythm: Normal rate and regular rhythm  Heart sounds: Normal heart sounds  No murmur heard  No friction rub  No gallop  Pulmonary:      Effort: Pulmonary effort is normal  No respiratory distress  Breath sounds: Normal breath sounds  No wheezing, rhonchi or rales  Chest:      Chest wall: No tenderness  Abdominal:      General: Bowel sounds are normal  There is no distension  Palpations: Abdomen is soft  There is no mass  Tenderness: There is no abdominal tenderness  There is no guarding or rebound  Hernia: No hernia is present  Musculoskeletal:         General: No tenderness or deformity  Normal range of motion  Cervical back: Normal range of motion and neck supple  Lymphadenopathy:      Cervical: No cervical adenopathy  Skin:     General: Skin is warm and dry  Capillary Refill: Capillary refill takes less than 2 seconds  Coloration: Skin is not pale  Findings: No erythema or rash  Neurological:      Mental Status: He is alert and oriented to person, place, and time     Psychiatric:         Behavior: Behavior normal          Vital Signs  ED Triage Vitals [05/03/23 1955]   Temperature Pulse Respirations Blood Pressure SpO2   97 8 °F (36 6 °C) 98 18 143/93 99 %      Temp Source Heart Rate Source Patient Position - Orthostatic VS BP Location FiO2 (%)   Oral Monitor Sitting Right arm --      Pain Score       --           Vitals:    05/03/23 1955   BP: 143/93   Pulse: 98   Patient Position - Orthostatic VS: Sitting         Visual Acuity      ED Medications  Medications - No data to display    Diagnostic Studies  Results Reviewed     Procedure Component Value Units Date/Time    Strep A PCR [[id number]]  (Normal) Collected: 05/03/23 2014    Lab Status: Final result Specimen: Throat Updated: 05/03/23 2044     STREP A PCR Not Detected    Basic metabolic panel [[id number]] Collected: 05/03/23 2014    Lab Status: Final result Specimen: Blood from Arm, Right Updated: 05/03/23 2034     Sodium 135 mmol/L      Potassium 3 5 mmol/L      Chloride 102 mmol/L      CO2 24 mmol/L      ANION GAP 9 mmol/L      BUN 12 mg/dL      Creatinine 0 86 mg/dL      Glucose 89 mg/dL      Calcium 8 8 mg/dL      eGFR 94 ml/min/1 73sq m     Narrative:      [place] guidelines for Chronic Kidney Disease (CKD):     Stage 1 with normal or high GFR (GFR > 90 mL/min/1 73 square meters)    Stage 2 Mild CKD (GFR = 60-89 mL/min/1 73 square meters)    Stage 3A Moderate CKD (GFR = 45-59 mL/min/1 73 square meters)    Stage 3B Moderate CKD (GFR = 30-44 mL/min/1 73 square meters)    Stage 4 Severe CKD (GFR = 15-29 mL/min/1 73 square meters)    Stage 5 End Stage CKD (GFR <15 mL/min/1 73 square meters)  Note: GFR calculation is accurate only with a steady state creatinine    CBC and differential [[id number]] Collected: 05/03/23 2014    Lab Status: Final result Specimen: Blood from Arm, Right Updated: 05/03/23 2022     WBC 7 27 Thousand/uL      RBC 4 15 Million/uL      Hemoglobin 12 6 g/dL      Hematocrit 37 9 %      MCV 91 fL      MCH 30 4 pg      MCHC 33 2 g/dL      RDW 12 4 %      MPV 10 1 fL      Platelets 442 Thousands/uL      nRBC 0 /100 WBCs      Neutrophils Relative 64 %      Immat GRANS % 0 %      Lymphocytes Relative 22 %      Monocytes Relative 11 %      Eosinophils Relative 2 %      Basophils Relative 1 %      Neutrophils Absolute 4 68 Thousands/µL      Immature Grans Absolute 0 01 Thousand/uL      Lymphocytes Absolute 1 62 Thousands/µL      Monocytes Absolute 0 80 Thousand/µL      Eosinophils Absolute 0 12 Thousand/µL      Basophils Absolute 0 04 Thousands/µL                  XR chest 1 view portable   ED Interpretation by Jayashree Rivas MD (05/03 2035)   Cxr NO acute cardiopulmonary findings                 Procedures  Procedures         ED Course                               SBIRT 20yo+    Flowsheet Row Most Recent Value   Initial Alcohol Screen: US AUDIT-C     1  How often do you have a drink containing alcohol? 0 Filed at: 05/03/2023 1957   2  How many drinks containing alcohol do you have on a typical day you are drinking? 0 Filed at: 05/03/2023 1957   3a  Male UNDER 65: How often do you have five or more drinks on one occasion? 0 Filed at: 05/03/2023 1957   Audit-C Score 0 Filed at: 05/03/2023 1957   REBECCA: How many times in the past year have you    Used an illegal drug or used a prescription medication for non-medical reasons? Never Filed at: 05/03/2023 1957                    Medical Decision Making  This is a 59years old came for having sore throat for 3 days  Patient has no fever  Patient was blowing his nose and he found blood  Patient also complaining of occasional dry cough  Patient has no history of smoking or drinking alcohol  Physical exam on the patient came back normal   There is no nasal bleeding and the throat is normal and there is no congestion or erythema  Blood test including CBC BMP came back normal   CXR shows no acute cardiopulmonary findings  Patient stated that when he came that he has anemia I told the patient that his hemoglobin is normal   PCR strep is pending but patient wants to go home and is going to check the results on [employer] website  At this time again discharge patient home follow-up with his PMD     Amount and/or Complexity of Data Reviewed  Labs: ordered  Details: WNL  Radiology: ordered and independent interpretation performed       Details: CXR; no acute cardiopulmonary findings          Disposition  Final diagnoses:   Sore throat   URI (upper respiratory infection)     Time reflects when diagnosis was documented in both MDM as applicable and the Disposition within this note     Time User Action Codes Description Comment    5/3/2023  8:38 PM Idella Hashimoto, Moheb Add [J02 9] Sore throat     5/3/2023  8:38 PM Arnulfo Beckford [J06 9] URI (upper respiratory infection)       ED Disposition     ED Disposition   Discharge    Condition   Stable    Date/Time   Wed May 3, 2023  8:42 PM    [address] discharge to home/self care  Follow-up Information     Follow up With Specialties Details Why Contact Info    Noah Aguiar MD Family Medicine In 1 week  [address]  [address]  [id number] [place] [address]  [phone]            Discharge Medication List as of 5/3/2023  8:45 PM      CONTINUE these medications which have NOT CHANGED    Details   amLODIPine (NORVASC) 5 mg tablet Take 1 tablet (5 mg total) by mouth daily, Starting Tue 2/7/2023, Normal      Cholecalciferol 10 MCG (400 UNIT) CAPS Take 400 Units by mouth daily, Historical Med      levothyroxine (Euthyrox) 50 mcg tablet Take 1 tablet (50 mcg total) by mouth daily, Starting Tue 1/31/2023, Normal      omeprazole (PriLOSEC) 40 MG capsule Starting Sun 12/11/2022, Historical Med      triamcinolone (KENALOG) 0 5 % cream APPLY TO AFFECTED AREA TWICE A DAY, Normal      UNKNOWN TO PATIENT Thyroid pill and stomach ulcer pill, Historical Med             No discharge procedures on file      PDMP Review     None          ED Provider  Electronically Signed by           Lori Walters MD  05/03/23 2051

## 2023-05-04 NOTE — ED NOTES
D/c reviewed with pt  Pt verbalized understanding and has no further questions at this time  Pt ambulatory off unit with steady gait       Leonarda You RN  05/03/23 2411

## 2023-05-04 NOTE — DISCHARGE INSTRUCTIONS
Follow up with your PMD  Drink warm beverages   Take tylenol for pain if needed  Check the website [employer] for the result of swab for the strep PCR  Labs Reviewed   STREP A PCR   CBC AND DIFFERENTIAL       Result Value Ref Range Status    WBC 7 27  4 31 - 10 16 Thousand/uL Final    RBC 4 15  3 88 - 5 62 Million/uL Final    Hemoglobin 12 6  12 0 - 17 0 g/dL Final    Hematocrit 37 9  36 5 - 49 3 % Final    MCV 91  82 - 98 fL Final    MCH 30 4  26 8 - 34 3 pg Final    MCHC 33 2  31 4 - 37 4 g/dL Final    RDW 12 4  11 6 - 15 1 % Final    MPV 10 1  8 9 - 12 7 fL Final    Platelets 179  917 - 390 Thousands/uL Final    nRBC 0  /100 WBCs Final    Neutrophils Relative 64  43 - 75 % Final    Immat GRANS % 0  0 - 2 % Final    Lymphocytes Relative 22  14 - 44 % Final    Monocytes Relative 11  4 - 12 % Final    Eosinophils Relative 2  0 - 6 % Final    Basophils Relative 1  0 - 1 % Final    Neutrophils Absolute 4 68  1 85 - 7 62 Thousands/µL Final    Immature Grans Absolute 0 01  0 00 - 0 20 Thousand/uL Final    Lymphocytes Absolute 1 62  0 60 - 4 47 Thousands/µL Final    Monocytes Absolute 0 80  0 17 - 1 22 Thousand/µL Final    Eosinophils Absolute 0 12  0 00 - 0 61 Thousand/µL Final    Basophils Absolute 0 04  0 00 - 0 10 Thousands/µL Final   BASIC METABOLIC PANEL    Sodium 431  135 - 147 mmol/L Final    Potassium 3 5  3 5 - 5 3 mmol/L Final    Chloride 102  96 - 108 mmol/L Final    CO2 24  21 - 32 mmol/L Final    ANION GAP 9  4 - 13 mmol/L Final    BUN 12  5 - 25 mg/dL Final    Creatinine 0 86  0 60 - 1 30 mg/dL Final    Comment: Standardized to IDMS reference method    Glucose 89  65 - 140 mg/dL Final    Comment: If the patient is fasting, the ADA then defines impaired fasting glucose as > 100 mg/dL and diabetes as > or equal to 123 mg/dL      Calcium 8 8  8 4 - 10 2 mg/dL Final    eGFR 94  ml/min/1 73sq m Final    Narrative:     [place] guidelines for Chronic Kidney Disease (CKD):     Stage 1 with normal or high GFR (GFR > 90 mL/min/1 73 square meters)    Stage 2 Mild CKD (GFR = 60-89 mL/min/1 73 square meters)    Stage 3A Moderate CKD (GFR = 45-59 mL/min/1 73 square meters)    Stage 3B Moderate CKD (GFR = 30-44 mL/min/1 73 square meters)    Stage 4 Severe CKD (GFR = 15-29 mL/min/1 73 square meters)    Stage 5 End Stage CKD (GFR <15 mL/min/1 73 square meters)  Note: GFR calculation is accurate only with a steady state creatinine     XR chest 1 view portable   ED Interpretation   Cxr NO acute cardiopulmonary findings

## 2023-08-24 ENCOUNTER — RA CDI HCC (OUTPATIENT)
Dept: OTHER | Facility: HOSPITAL | Age: 58
End: 2023-08-24

## 2023-08-24 NOTE — PROGRESS NOTES
[address] [facility] coding opportunities       Chart reviewed, no opportunity found: CHART REVIEWED, NO OPPORTUNITY FOUND        Patients Insurance        Commercial Insurance: [address]

## 2023-08-29 PROBLEM — Z86.010 HISTORY OF COLON POLYPS: Status: ACTIVE | Noted: 2023-08-29

## 2023-08-29 PROBLEM — E78.5 DYSLIPIDEMIA: Status: ACTIVE | Noted: 2023-08-29

## 2023-08-29 PROBLEM — Z86.0100 HISTORY OF COLON POLYPS: Status: ACTIVE | Noted: 2023-08-29

## 2023-10-13 ENCOUNTER — OFFICE VISIT (OUTPATIENT)
Dept: FAMILY MEDICINE CLINIC | Facility: CLINIC | Age: 58
End: 2023-10-13
Payer: COMMERCIAL

## 2023-10-13 ENCOUNTER — HOSPITAL ENCOUNTER (OUTPATIENT)
Dept: RADIOLOGY | Facility: HOSPITAL | Age: 58
End: 2023-10-13
Payer: COMMERCIAL

## 2023-10-13 VITALS
WEIGHT: 169 LBS | HEART RATE: 86 BPM | BODY MASS INDEX: 27.16 KG/M2 | OXYGEN SATURATION: 99 % | HEIGHT: 66 IN | TEMPERATURE: 98 F | DIASTOLIC BLOOD PRESSURE: 82 MMHG | SYSTOLIC BLOOD PRESSURE: 132 MMHG | RESPIRATION RATE: 16 BRPM

## 2023-10-13 DIAGNOSIS — Z23 ENCOUNTER FOR IMMUNIZATION: ICD-10-CM

## 2023-10-13 DIAGNOSIS — M54.42 ACUTE MIDLINE LOW BACK PAIN WITH LEFT-SIDED SCIATICA: ICD-10-CM

## 2023-10-13 DIAGNOSIS — K21.9 GASTROESOPHAGEAL REFLUX DISEASE WITHOUT ESOPHAGITIS: ICD-10-CM

## 2023-10-13 DIAGNOSIS — E78.5 DYSLIPIDEMIA: ICD-10-CM

## 2023-10-13 DIAGNOSIS — M25.552 LEFT HIP PAIN: ICD-10-CM

## 2023-10-13 DIAGNOSIS — Z86.010 HISTORY OF COLON POLYPS: Primary | ICD-10-CM

## 2023-10-13 DIAGNOSIS — I10 PRIMARY HYPERTENSION: ICD-10-CM

## 2023-10-13 DIAGNOSIS — E89.0 POSTOPERATIVE HYPOTHYROIDISM: ICD-10-CM

## 2023-10-13 PROBLEM — M54.50 LOW BACK PAIN: Status: ACTIVE | Noted: 2023-10-13

## 2023-10-13 PROCEDURE — 73502 X-RAY EXAM HIP UNI 2-3 VIEWS: CPT

## 2023-10-13 PROCEDURE — 90686 IIV4 VACC NO PRSV 0.5 ML IM: CPT | Performed by: FAMILY MEDICINE

## 2023-10-13 PROCEDURE — 90471 IMMUNIZATION ADMIN: CPT | Performed by: FAMILY MEDICINE

## 2023-10-13 PROCEDURE — 99214 OFFICE O/P EST MOD 30 MIN: CPT | Performed by: FAMILY MEDICINE

## 2023-10-13 PROCEDURE — 72110 X-RAY EXAM L-2 SPINE 4/>VWS: CPT

## 2023-10-13 RX ORDER — MELOXICAM 15 MG/1
15 TABLET ORAL DAILY PRN
Qty: 15 TABLET | Refills: 0 | Status: SHIPPED | OUTPATIENT
Start: 2023-10-13

## 2023-10-13 RX ORDER — LEVOTHYROXINE SODIUM 88 UG/1
TABLET ORAL
COMMUNITY
Start: 2023-07-28

## 2023-10-13 RX ORDER — CYCLOBENZAPRINE HCL 10 MG
10 TABLET ORAL
Qty: 15 TABLET | Refills: 0 | Status: SHIPPED | OUTPATIENT
Start: 2023-10-13

## 2023-10-13 NOTE — PROGRESS NOTES
Name: Michelle Meza      : 1965      MRN: [id number]  Encounter Provider: Karla Dillon MD  Encounter Date: 10/13/2023   Encounter department: [address] [place] [address]    Assessment & Plan     1. History of colon polyps  Assessment & Plan:  Due for colonoscopy      Orders:  -     Ambulatory Referral to Gastroenterology; Future    2. Primary hypertension  Assessment & Plan:  Well controlled on current therapy continue with current medications and will reassess next visit        3. Gastroesophageal reflux disease without esophagitis  Assessment & Plan:  Ok on omeprazole    Orders:  -     Ambulatory Referral to Gastroenterology; Future    4. Postoperative hypothyroidism  Assessment & Plan:  Pt is monitored  by dr Sykes   surgeon and TSH is ok       5. Encounter for immunization  -     influenza vaccine, quadrivalent, 0.5 mL, preservative-free, for adult and pediatric patients 6 mos+ (AFLURIA, FLUARIX, FLULAVAL, FLUZONE)    6. Acute midline low back pain with left-sided sciatica  Assessment & Plan:  2 weeks  mid back lumbar radiating to left side and into left hip    Orders:  -     XR spine lumbar minimum 4 views non injury; Future; Expected date: 10/13/2023  -     meloxicam (MOBIC) 15 mg tablet; Take 1 tablet (15 mg total) by mouth daily as needed for moderate pain  -     cyclobenzaprine (FLEXERIL) 10 mg tablet; Take 1 tablet (10 mg total) by mouth daily at bedtime    7. Left hip pain  Assessment & Plan:  Left hip pain radiating to left thigh will check xray     Orders:  -     meloxicam (MOBIC) 15 mg tablet; Take 1 tablet (15 mg total) by mouth daily as needed for moderate pain  -     XR hip/pelv 2-3 vws left if performed; Future; Expected date: 10/13/2023    8. Dyslipidemia  Assessment & Plan:  lDL 121 watch saturated fats /sweets          Depression Screening and Follow-up Plan: Patient was screened for depression during today's encounter.  They screened negative with a PHQ-2 score of 0.        Subjective      Back Pain  Pertinent negatives include no chest pain, headaches or weakness. Hip Pain      pt here with complaints of back pain midline for 2 weeks radiating to left side and into eft hip with some  "tension"  Review of Systems   Respiratory:  Negative for cough, chest tightness and shortness of breath. Cardiovascular:  Negative for chest pain, palpitations and leg swelling. Musculoskeletal:  Positive for arthralgias (left hip) and back pain. Neurological:  Negative for dizziness, weakness, light-headedness and headaches. Psychiatric/Behavioral:  Negative for dysphoric mood. The patient is not nervous/anxious. in left thigh no urinary or bowel complaints     Current Outpatient Medications on File Prior to Visit   Medication Sig   • amLODIPine (NORVASC) 5 mg tablet Take 1 tablet (5 mg total) by mouth daily   • Cholecalciferol 10 MCG (400 UNIT) CAPS Take 400 Units by mouth daily   • levothyroxine 88 mcg tablet    • omeprazole (PriLOSEC) 40 MG capsule    • triamcinolone (KENALOG) 0.5 % cream APPLY TO AFFECTED AREA TWICE A DAY   • [DISCONTINUED] levothyroxine (Euthyrox) 50 mcg tablet Take 1 tablet (50 mcg total) by mouth daily (Patient not taking: Reported on 10/13/2023)   • [DISCONTINUED] UNKNOWN TO PATIENT Thyroid pill and stomach ulcer pill (Patient not taking: Reported on 1/13/2023)       Objective     /82 (BP Location: Left arm, Patient Position: Sitting, Cuff Size: Standard)   Pulse 86   Temp 98 °F (36.7 °C) (Tympanic)   Resp 16   Ht 5' 6" (1.676 m)   Wt 76.7 kg (169 lb)   SpO2 99%   BMI 27.28 kg/m²     Physical Exam  Constitutional:       General: He is not in acute distress. Appearance: Normal appearance. He is well-developed. He is not ill-appearing. Eyes:      Extraocular Movements: Extraocular movements intact. Neck:      Thyroid: No thyromegaly. Cardiovascular:      Rate and Rhythm: Normal rate.    Pulmonary:      Effort: Pulmonary effort is normal. No respiratory distress. Breath sounds: Normal breath sounds. Musculoskeletal:         General: Tenderness (lumbar midback) present. Cervical back: Normal range of motion. Neurological:      General: No focal deficit present. Mental Status: He is alert and oriented to person, place, and time. Mental status is at baseline. Motor: No weakness.       Gait: Gait normal.   Psychiatric:         Mood and Affect: Mood normal.         Behavior: Behavior normal.       Gregg Gomez MD

## 2023-10-20 ENCOUNTER — OFFICE VISIT (OUTPATIENT)
Dept: GASTROENTEROLOGY | Facility: CLINIC | Age: 58
End: 2023-10-20

## 2023-10-20 ENCOUNTER — TELEPHONE (OUTPATIENT)
Dept: GASTROENTEROLOGY | Facility: CLINIC | Age: 58
End: 2023-10-20

## 2023-10-20 VITALS
SYSTOLIC BLOOD PRESSURE: 140 MMHG | HEART RATE: 70 BPM | WEIGHT: 167 LBS | BODY MASS INDEX: 26.84 KG/M2 | DIASTOLIC BLOOD PRESSURE: 98 MMHG | HEIGHT: 66 IN

## 2023-10-20 DIAGNOSIS — K21.9 GASTROESOPHAGEAL REFLUX DISEASE WITHOUT ESOPHAGITIS: ICD-10-CM

## 2023-10-20 DIAGNOSIS — Z86.010 HISTORY OF COLON POLYPS: ICD-10-CM

## 2023-10-20 NOTE — TELEPHONE ENCOUNTER
Scheduled date of EGD/colonoscopy (as of today):11/1/23  Physician performing EGD/colonoscopy:Dr Aylin Zamarripa   Location of EGD/colonoscopy:[facility]  Desired bowel prep reviewed with patient:george   Instructions reviewed with patient by:sb  Clearances:  none

## 2023-10-20 NOTE — H&P (VIEW-ONLY)
[facility] - Outpatient Consultation  Fazal Hernandez 59 y.o. male MRN: [id number]  Encounter: [id number]          ASSESSMENT AND PLAN:      1. History of colon polyps  History of polyps recommended repeat in 5 years last colonoscopy was 6 years ago  - Ambulatory Referral to Gastroenterology  - Colonoscopy; Future    2. Gastroesophageal reflux disease without esophagitis  On chronic PPI last EGD over 6 years ago. He will otherwise follow-up in a year  - Ambulatory Referral to Gastroenterology  - EGD; Future    ______________________________________________________________________    HPI: 78-year-old gentleman seen by Dr. Vicki Marshall has EGD and colonoscopies in the past he is due for EGD colonoscopy due to chronic reflux and history of polyps. Patient has no particular complaints. He does take omeprazole on a daily basis. REVIEW OF SYSTEMS:    CONSTITUTIONAL: Denies any fever, chills, rigors, and weight loss. HEENT: No earache or tinnitus. Denies hearing loss or visual disturbances. CARDIOVASCULAR: No chest pain or palpitations. RESPIRATORY: Denies any cough, hemoptysis, shortness of breath or dyspnea on exertion. GASTROINTESTINAL: As noted in the History of Present Illness. GENITOURINARY: No problems with urination. Denies any hematuria or dysuria. NEUROLOGIC: No dizziness or vertigo, denies headaches. MUSCULOSKELETAL: Denies any muscle or joint pain. SKIN: Denies skin rashes or itching. ENDOCRINE: Denies excessive thirst. Denies intolerance to heat or cold. PSYCHOSOCIAL: Denies depression or anxiety. Denies any recent memory loss.        Historical Information   Past Medical History:   Diagnosis Date    Disease of thyroid gland     Stomach ulcer      Past Surgical History:   Procedure Laterality Date    EYE SURGERY       Social History   Social History     Substance and Sexual Activity   Alcohol Use Yes    Comment: rarely     Social History     Substance and Sexual Activity   Drug Use No     Social History     Tobacco Use   Smoking Status Former    Packs/day: 0.50    Years: 10.00    Total pack years: 5.00    Types: Cigarettes    Quit date: 2013    Years since quitting: 10.8   Smokeless Tobacco Never     Family History   Problem Relation Age of Onset    Cancer Mother        Meds/Allergies       Current Outpatient Medications:     amLODIPine (NORVASC) 5 mg tablet    Cholecalciferol 10 MCG (400 UNIT) CAPS    cyclobenzaprine (FLEXERIL) 10 mg tablet    levothyroxine 88 mcg tablet    meloxicam (MOBIC) 15 mg tablet    omeprazole (PriLOSEC) 40 MG capsule    triamcinolone (KENALOG) 0.5 % cream    No Known Allergies        Objective     Blood pressure 140/98, pulse 70, height 5' 6" (1.676 m), weight 75.8 kg (167 lb). Body mass index is 26.95 kg/m². PHYSICAL EXAM:      General Appearance:   Alert, cooperative, no distress   HEENT:   Normocephalic, atraumatic, anicteric. Neck:  Supple, symmetrical, trachea midline   Lungs:   Clear to auscultation bilaterally; no rales, rhonchi or wheezing; respirations unlabored    Heart[de-identified]   Regular rate and rhythm; no murmur, rub, or gallop. Abdomen:   Soft, non-tender, non-distended; normal bowel sounds; no masses, no organomegaly    Genitalia:   Deferred    Rectal:   Deferred    Extremities:  No cyanosis, clubbing or edema    Pulses:  2+ and symmetric    Skin:  No jaundice, rashes, or lesions    Lymph nodes:  No palpable cervical lymphadenopathy        Lab Results:   No visits with results within 1 Day(s) from this visit.    Latest known visit with results is:   Admission on 05/03/2023, Discharged on 05/03/2023   Component Date Value    STREP A PCR 05/03/2023 Not Detected     WBC 05/03/2023 7.27     RBC 05/03/2023 4.15     Hemoglobin 05/03/2023 12.6     Hematocrit 05/03/2023 37.9     MCV 05/03/2023 91     MCH 05/03/2023 30.4     MCHC 05/03/2023 33.2     RDW 05/03/2023 12.4     MPV 05/03/2023 10.1     Platelets 29/31/4400 281     nRBC 05/03/2023 0     Neutrophils Relative 05/03/2023 64     Immat GRANS % 05/03/2023 0     Lymphocytes Relative 05/03/2023 22     Monocytes Relative 05/03/2023 11     Eosinophils Relative 05/03/2023 2     Basophils Relative 05/03/2023 1     Neutrophils Absolute 05/03/2023 4.68     Immature Grans Absolute 05/03/2023 0.01     Lymphocytes Absolute 05/03/2023 1.62     Monocytes Absolute 05/03/2023 0.80     Eosinophils Absolute 05/03/2023 0.12     Basophils Absolute 05/03/2023 0.04     Sodium 05/03/2023 135     Potassium 05/03/2023 3.5     Chloride 05/03/2023 102     CO2 05/03/2023 24     ANION GAP 05/03/2023 9     BUN 05/03/2023 12     Creatinine 05/03/2023 0.86     Glucose 05/03/2023 89     Calcium 05/03/2023 8.8     eGFR 05/03/2023 94          Radiology Results:   No results found.

## 2023-10-20 NOTE — PROGRESS NOTES
[facility] - Outpatient Consultation  Elvira Dunbar 59 y.o. male MRN: [id number]  Encounter: [id number]          ASSESSMENT AND PLAN:      1. History of colon polyps  History of polyps recommended repeat in 5 years last colonoscopy was 6 years ago  - Ambulatory Referral to Gastroenterology  - Colonoscopy; Future    2. Gastroesophageal reflux disease without esophagitis  On chronic PPI last EGD over 6 years ago. He will otherwise follow-up in a year  - Ambulatory Referral to Gastroenterology  - EGD; Future    ______________________________________________________________________    HPI: 78-year-old gentleman seen by Dr. Greer Record has EGD and colonoscopies in the past he is due for EGD colonoscopy due to chronic reflux and history of polyps. Patient has no particular complaints. He does take omeprazole on a daily basis. REVIEW OF SYSTEMS:    CONSTITUTIONAL: Denies any fever, chills, rigors, and weight loss. HEENT: No earache or tinnitus. Denies hearing loss or visual disturbances. CARDIOVASCULAR: No chest pain or palpitations. RESPIRATORY: Denies any cough, hemoptysis, shortness of breath or dyspnea on exertion. GASTROINTESTINAL: As noted in the History of Present Illness. GENITOURINARY: No problems with urination. Denies any hematuria or dysuria. NEUROLOGIC: No dizziness or vertigo, denies headaches. MUSCULOSKELETAL: Denies any muscle or joint pain. SKIN: Denies skin rashes or itching. ENDOCRINE: Denies excessive thirst. Denies intolerance to heat or cold. PSYCHOSOCIAL: Denies depression or anxiety. Denies any recent memory loss.        Historical Information   Past Medical History:   Diagnosis Date    Disease of thyroid gland     Stomach ulcer      Past Surgical History:   Procedure Laterality Date    EYE SURGERY       Social History   Social History     Substance and Sexual Activity   Alcohol Use Yes    Comment: rarely     Social History     Substance and Sexual Activity   Drug Use No     Social History     Tobacco Use   Smoking Status Former    Packs/day: 0.50    Years: 10.00    Total pack years: 5.00    Types: Cigarettes    Quit date: 2013    Years since quitting: 10.8   Smokeless Tobacco Never     Family History   Problem Relation Age of Onset    Cancer Mother        Meds/Allergies       Current Outpatient Medications:     amLODIPine (NORVASC) 5 mg tablet    Cholecalciferol 10 MCG (400 UNIT) CAPS    cyclobenzaprine (FLEXERIL) 10 mg tablet    levothyroxine 88 mcg tablet    meloxicam (MOBIC) 15 mg tablet    omeprazole (PriLOSEC) 40 MG capsule    triamcinolone (KENALOG) 0.5 % cream    No Known Allergies        Objective     Blood pressure 140/98, pulse 70, height 5' 6" (1.676 m), weight 75.8 kg (167 lb). Body mass index is 26.95 kg/m². PHYSICAL EXAM:      General Appearance:   Alert, cooperative, no distress   HEENT:   Normocephalic, atraumatic, anicteric. Neck:  Supple, symmetrical, trachea midline   Lungs:   Clear to auscultation bilaterally; no rales, rhonchi or wheezing; respirations unlabored    Heart[de-identified]   Regular rate and rhythm; no murmur, rub, or gallop. Abdomen:   Soft, non-tender, non-distended; normal bowel sounds; no masses, no organomegaly    Genitalia:   Deferred    Rectal:   Deferred    Extremities:  No cyanosis, clubbing or edema    Pulses:  2+ and symmetric    Skin:  No jaundice, rashes, or lesions    Lymph nodes:  No palpable cervical lymphadenopathy        Lab Results:   No visits with results within 1 Day(s) from this visit.    Latest known visit with results is:   Admission on 05/03/2023, Discharged on 05/03/2023   Component Date Value    STREP A PCR 05/03/2023 Not Detected     WBC 05/03/2023 7.27     RBC 05/03/2023 4.15     Hemoglobin 05/03/2023 12.6     Hematocrit 05/03/2023 37.9     MCV 05/03/2023 91     MCH 05/03/2023 30.4     MCHC 05/03/2023 33.2     RDW 05/03/2023 12.4     MPV 05/03/2023 10.1     Platelets 96/64/3742 281     nRBC 05/03/2023 0     Neutrophils Relative 05/03/2023 64     Immat GRANS % 05/03/2023 0     Lymphocytes Relative 05/03/2023 22     Monocytes Relative 05/03/2023 11     Eosinophils Relative 05/03/2023 2     Basophils Relative 05/03/2023 1     Neutrophils Absolute 05/03/2023 4.68     Immature Grans Absolute 05/03/2023 0.01     Lymphocytes Absolute 05/03/2023 1.62     Monocytes Absolute 05/03/2023 0.80     Eosinophils Absolute 05/03/2023 0.12     Basophils Absolute 05/03/2023 0.04     Sodium 05/03/2023 135     Potassium 05/03/2023 3.5     Chloride 05/03/2023 102     CO2 05/03/2023 24     ANION GAP 05/03/2023 9     BUN 05/03/2023 12     Creatinine 05/03/2023 0.86     Glucose 05/03/2023 89     Calcium 05/03/2023 8.8     eGFR 05/03/2023 94          Radiology Results:   No results found.

## 2023-10-23 DIAGNOSIS — K21.9 GASTROESOPHAGEAL REFLUX DISEASE WITHOUT ESOPHAGITIS: Primary | ICD-10-CM

## 2023-10-24 RX ORDER — OMEPRAZOLE 40 MG/1
40 CAPSULE, DELAYED RELEASE ORAL DAILY
Qty: 30 CAPSULE | Refills: 1 | Status: SHIPPED | OUTPATIENT
Start: 2023-10-24

## 2023-10-27 ENCOUNTER — ANESTHESIA EVENT (OUTPATIENT)
Dept: ANESTHESIOLOGY | Facility: AMBULATORY SURGERY CENTER | Age: 58
End: 2023-10-27

## 2023-10-27 ENCOUNTER — ANESTHESIA (OUTPATIENT)
Dept: ANESTHESIOLOGY | Facility: AMBULATORY SURGERY CENTER | Age: 58
End: 2023-10-27

## 2023-10-30 ENCOUNTER — TELEPHONE (OUTPATIENT)
Dept: GASTROENTEROLOGY | Facility: AMBULATORY SURGERY CENTER | Age: 58
End: 2023-10-30

## 2023-11-01 ENCOUNTER — ANESTHESIA (OUTPATIENT)
Dept: GASTROENTEROLOGY | Facility: AMBULATORY SURGERY CENTER | Age: 58
End: 2023-11-01

## 2023-11-01 ENCOUNTER — HOSPITAL ENCOUNTER (OUTPATIENT)
Dept: GASTROENTEROLOGY | Facility: AMBULATORY SURGERY CENTER | Age: 58
Discharge: HOME | End: 2023-11-01
Payer: COMMERCIAL

## 2023-11-01 ENCOUNTER — ANESTHESIA EVENT (OUTPATIENT)
Dept: GASTROENTEROLOGY | Facility: AMBULATORY SURGERY CENTER | Age: 58
End: 2023-11-01

## 2023-11-01 VITALS
HEART RATE: 71 BPM | WEIGHT: 158 LBS | OXYGEN SATURATION: 99 % | SYSTOLIC BLOOD PRESSURE: 138 MMHG | BODY MASS INDEX: 25.39 KG/M2 | RESPIRATION RATE: 18 BRPM | HEIGHT: 66 IN | TEMPERATURE: 97 F | DIASTOLIC BLOOD PRESSURE: 88 MMHG

## 2023-11-01 DIAGNOSIS — Z86.010 HISTORY OF COLON POLYPS: ICD-10-CM

## 2023-11-01 DIAGNOSIS — K21.9 GASTROESOPHAGEAL REFLUX DISEASE WITHOUT ESOPHAGITIS: ICD-10-CM

## 2023-11-01 PROCEDURE — 88305 TISSUE EXAM BY PATHOLOGIST: CPT | Performed by: STUDENT IN AN ORGANIZED HEALTH CARE EDUCATION/TRAINING PROGRAM

## 2023-11-01 PROCEDURE — G0121 COLON CA SCRN NOT HI RSK IND: HCPCS | Performed by: INTERNAL MEDICINE

## 2023-11-01 PROCEDURE — 43239 EGD BIOPSY SINGLE/MULTIPLE: CPT | Performed by: INTERNAL MEDICINE

## 2023-11-01 RX ORDER — SODIUM CHLORIDE, SODIUM LACTATE, POTASSIUM CHLORIDE, CALCIUM CHLORIDE 600; 310; 30; 20 MG/100ML; MG/100ML; MG/100ML; MG/100ML
125 INJECTION, SOLUTION INTRAVENOUS CONTINUOUS
Status: CANCELLED | OUTPATIENT
Start: 2023-11-01

## 2023-11-01 RX ORDER — SODIUM CHLORIDE, SODIUM LACTATE, POTASSIUM CHLORIDE, CALCIUM CHLORIDE 600; 310; 30; 20 MG/100ML; MG/100ML; MG/100ML; MG/100ML
125 INJECTION, SOLUTION INTRAVENOUS CONTINUOUS
Status: DISCONTINUED | OUTPATIENT
Start: 2023-11-01 | End: 2023-11-05 | Stop reason: HOSPADM

## 2023-11-01 RX ORDER — PROPOFOL 10 MG/ML
INJECTION, EMULSION INTRAVENOUS AS NEEDED
Status: DISCONTINUED | OUTPATIENT
Start: 2023-11-01 | End: 2023-11-01

## 2023-11-01 RX ADMIN — PROPOFOL 20 MG: 10 INJECTION, EMULSION INTRAVENOUS at 10:40

## 2023-11-01 RX ADMIN — PROPOFOL 40 MG: 10 INJECTION, EMULSION INTRAVENOUS at 10:41

## 2023-11-01 RX ADMIN — SODIUM CHLORIDE, SODIUM LACTATE, POTASSIUM CHLORIDE, CALCIUM CHLORIDE: 600; 310; 30; 20 INJECTION, SOLUTION INTRAVENOUS at 10:32

## 2023-11-01 RX ADMIN — PROPOFOL 150 MG: 10 INJECTION, EMULSION INTRAVENOUS at 10:36

## 2023-11-01 RX ADMIN — PROPOFOL 40 MG: 10 INJECTION, EMULSION INTRAVENOUS at 10:47

## 2023-11-01 RX ADMIN — PROPOFOL 30 MG: 10 INJECTION, EMULSION INTRAVENOUS at 10:53

## 2023-11-01 RX ADMIN — PROPOFOL 30 MG: 10 INJECTION, EMULSION INTRAVENOUS at 10:38

## 2023-11-01 RX ADMIN — PROPOFOL 40 MG: 10 INJECTION, EMULSION INTRAVENOUS at 10:44

## 2023-11-01 RX ADMIN — PROPOFOL 30 MG: 10 INJECTION, EMULSION INTRAVENOUS at 10:50

## 2023-11-01 NOTE — DISCHARGE SUMMARY
Discharge Summary - Danuta Osullivan 59 y.o. male MRN: [id number]    Unit/Bed#:  Encounter: [id number]    Admission Date: 11/1/2023    Admitting Diagnosis: History of colon polyps [Z86.010]  Gastroesophageal reflux disease without esophagitis [K21.9]    HPI: History of colon polyp and reflux    Procedures Performed: No orders of the defined types were placed in this encounter. Summary of Hospital Course: Tolerated procedure well    Significant Findings, Care, Treatment and Services Provided: Significant reflux not seen. There is no evidence of Haro's esophagus. Esophageal biopsies taken. No polyp noted in the colon. Complications: None    Discharge Diagnosis: See above    Medical Problems       Resolved Problems  Date Reviewed: 10/20/2023   None         Condition at Discharge: good         Discharge instructions/Information to patient and family:   See after visit summary for information provided to patient and family. Provisions for Follow-Up Care:  See after visit summary for information related to follow-up care and any pertinent home health orders.       PCP: Mannie Deng MD    Disposition: Home

## 2023-11-01 NOTE — ANESTHESIA POSTPROCEDURE EVALUATION
Post-Op Assessment Note    CV Status:  Stable  Pain Score: 0    Pain management: adequate     Mental Status:  Arousable and sleepy   Hydration Status:  Euvolemic and stable   PONV Controlled:  Controlled   Airway Patency:  Patent and adequate      Post Op Vitals Reviewed: Yes      Staff: CRNA         No notable events documented.     BP   106/67   Temp     Pulse 72   Resp 14   SpO2 97% RA

## 2023-11-01 NOTE — ANESTHESIA PREPROCEDURE EVALUATION
Procedure:  COLONOSCOPY  EGD    Relevant Problems   ANESTHESIA (within normal limits)      CARDIO   (+) Primary hypertension      ENDO   (+) Postoperative hypothyroidism      GI/HEPATIC   (+) Gastroesophageal reflux disease without esophagitis      HEMATOLOGY   (+) Anemia, mild      MUSCULOSKELETAL   (+) Low back pain        Physical Exam    Airway    Mallampati score: III  TM Distance: >3 FB  Neck ROM: full     Dental   No notable dental hx     Cardiovascular      Pulmonary      Other Findings        Anesthesia Plan  ASA Score- 2     Anesthesia Type- IV sedation with anesthesia with ASA Monitors. Additional Monitors:     Airway Plan:            Plan Factors-Exercise tolerance (METS): >4 METS. Chart reviewed. EKG reviewed. Existing labs reviewed. Patient summary reviewed. Patient is not a current smoker. Induction- intravenous. Postoperative Plan-     Informed Consent- Anesthetic plan and risks discussed with patient. I personally reviewed this patient with the CRNA. Discussed and agreed on the Anesthesia Plan with the CRNA. Yovanny Hurd

## 2023-11-01 NOTE — INTERVAL H&P NOTE
H&P reviewed. After examining the patient I find no changes in the patients condition since the H&P had been written.     Vitals:    11/01/23 1003   BP: 139/84   Pulse: 82   Resp: 18   Temp: (!) 97 °F (36.1 °C)   SpO2: 99%

## 2023-11-06 PROCEDURE — 88305 TISSUE EXAM BY PATHOLOGIST: CPT | Performed by: STUDENT IN AN ORGANIZED HEALTH CARE EDUCATION/TRAINING PROGRAM

## 2023-11-09 ENCOUNTER — TELEPHONE (OUTPATIENT)
Dept: GASTROENTEROLOGY | Facility: CLINIC | Age: 58
End: 2023-11-09

## 2023-11-09 NOTE — TELEPHONE ENCOUNTER
Patients GI provider:  Dr. Bethany Llamas     Number to return call: ( [phone]    Reason for call: Pt calling pt called back for results.  Pt scheduled for f/u on     Scheduled procedure/appointment date if applicable: Apt/procedure

## 2023-11-09 NOTE — TELEPHONE ENCOUNTER
----- Message from Benji Batista MD sent at 11/7/2023  2:44 PM EST -----  Esophageal biopsy does not show any inflammation. Continue current regimen.   Office visit in 2 to 3 months

## 2023-11-24 ENCOUNTER — OFFICE VISIT (OUTPATIENT)
Dept: FAMILY MEDICINE CLINIC | Facility: CLINIC | Age: 58
End: 2023-11-24
Payer: COMMERCIAL

## 2023-11-24 VITALS
TEMPERATURE: 97.8 F | HEIGHT: 66 IN | DIASTOLIC BLOOD PRESSURE: 74 MMHG | WEIGHT: 170 LBS | SYSTOLIC BLOOD PRESSURE: 128 MMHG | BODY MASS INDEX: 27.32 KG/M2 | HEART RATE: 76 BPM | RESPIRATION RATE: 16 BRPM | OXYGEN SATURATION: 99 %

## 2023-11-24 DIAGNOSIS — K21.9 GASTROESOPHAGEAL REFLUX DISEASE WITHOUT ESOPHAGITIS: ICD-10-CM

## 2023-11-24 DIAGNOSIS — R10.33 PERIUMBILICAL ABDOMINAL PAIN: Primary | ICD-10-CM

## 2023-11-24 PROCEDURE — 99214 OFFICE O/P EST MOD 30 MIN: CPT

## 2023-11-24 RX ORDER — FAMOTIDINE 20 MG/1
20 TABLET, FILM COATED ORAL 2 TIMES DAILY
Qty: 180 TABLET | Refills: 1 | Status: SHIPPED | OUTPATIENT
Start: 2023-11-24

## 2023-11-24 NOTE — ASSESSMENT & PLAN NOTE
Pain x 2 weeks rates as 4/10, no aggravating factors. On exam mild tenderness in periumbilical region, pain is localized, pt denies N/V, fever endorses reflux symptoms and intermittent abdominal bloating. Discussed poss Gerd vs appendicitis vs hernia will obtain abdominal U/S educated on strict Gerd diet, tylenol prn and must go to ER for worsening sxs.

## 2023-11-24 NOTE — PROGRESS NOTES
Name: Bell Staff      : 1965      MRN: [id number]  Encounter Provider: ANIBAL Jose  Encounter Date: 2023   Encounter department: [facility] [address]    Assessment & Plan     1. Periumbilical abdominal pain  Assessment & Plan:  Pain x 2 weeks rates as 4/10, no aggravating factors. On exam mild tenderness in periumbilical region, pain is localized, pt denies N/V, fever endorses reflux symptoms and intermittent abdominal bloating. Discussed poss Gerd vs appendicitis vs hernia will obtain abdominal U/S educated on strict Gerd diet, tylenol prn and must go to ER for worsening sxs. Orders:  -     US abdomen complete; Future; Expected date: 2023    2. Gastroesophageal reflux disease without esophagitis  Assessment & Plan:  Per recommendation from EGD 10/20/23 discussed weaning PPI, will send script for Famotidine 20 mg bid. Education on [facility] provided on AVS pt will trial taking omeprazole qod as has a lot of medication at home then switch to Famotidine. Orders:  -     famotidine (PEPCID) 20 mg tablet; Take 1 tablet (20 mg total) by mouth 2 (two) times a day        Depression Screening and Follow-up Plan: Patient was screened for depression during today's encounter. They screened negative with a PHQ-2 score of 0. Subjective      Pt presents with c/o intermittent periumbilical pain for the last two weeks was having some groin pain however no longer present. Pt has a lot of bloating pt had EGD/colonoscopy on 10/20/23 which shows mild inflammation of lower esophagus with recommendations to wean PPI. Pt is taking omeprazole 40 mg qd states has been on medication for several years, does not adhere to [facility]. Pt denies N/V has tenderness on palpation of periumbilical region pain does not radiate. Review of Systems   Constitutional:  Negative for chills, fatigue and fever. HENT:  Negative for congestion.     Respiratory:  Negative for cough, chest tightness, shortness of breath and wheezing. Cardiovascular:  Negative for chest pain, palpitations and leg swelling. Gastrointestinal:  Positive for abdominal distention and abdominal pain. Negative for anal bleeding, blood in stool, constipation, diarrhea, nausea, rectal pain and vomiting. Genitourinary:  Negative for difficulty urinating, dysuria, flank pain, frequency, genital sores, penile discharge, penile pain, penile swelling, scrotal swelling and testicular pain. Musculoskeletal:  Negative for back pain, gait problem and joint swelling. Skin:  Negative for color change. Allergic/Immunologic: Negative for environmental allergies, food allergies and immunocompromised state. Psychiatric/Behavioral:  Negative for agitation. Current Outpatient Medications on File Prior to Visit   Medication Sig    amLODIPine (NORVASC) 5 mg tablet Take 1 tablet (5 mg total) by mouth daily    Cholecalciferol 10 MCG (400 UNIT) CAPS Take 400 Units by mouth daily    cyclobenzaprine (FLEXERIL) 10 mg tablet Take 1 tablet (10 mg total) by mouth daily at bedtime    levothyroxine 88 mcg tablet     meloxicam (MOBIC) 15 mg tablet Take 1 tablet (15 mg total) by mouth daily as needed for moderate pain    omeprazole (PriLOSEC) 40 MG capsule Take 1 capsule (40 mg total) by mouth daily    triamcinolone (KENALOG) 0.5 % cream APPLY TO AFFECTED AREA TWICE A DAY       Objective     /74 (BP Location: Right arm, Patient Position: Sitting, Cuff Size: Adult)   Pulse 76   Temp 97.8 °F (36.6 °C) (Tympanic)   Resp 16   Ht 5' 6" (1.676 m)   Wt 77.1 kg (170 lb)   SpO2 99%   BMI 27.44 kg/m²     Physical Exam  Vitals and nursing note reviewed. Constitutional:       General: He is not in acute distress. Appearance: Normal appearance. He is normal weight. He is not ill-appearing, toxic-appearing or diaphoretic. HENT:      Head: Normocephalic and atraumatic.       Right Ear: Tympanic membrane, ear canal and external ear normal. There is no impacted cerumen. Left Ear: Tympanic membrane, ear canal and external ear normal. There is no impacted cerumen. Nose: Nose normal. No congestion or rhinorrhea. Mouth/Throat:      Mouth: Mucous membranes are moist.      Pharynx: No oropharyngeal exudate or posterior oropharyngeal erythema. Eyes:      General:         Right eye: No discharge. Left eye: No discharge. Extraocular Movements: Extraocular movements intact. Conjunctiva/sclera: Conjunctivae normal.      Pupils: Pupils are equal, round, and reactive to light. Neck:      Vascular: No carotid bruit. Cardiovascular:      Rate and Rhythm: Normal rate and regular rhythm. Pulses: Normal pulses. Heart sounds: Normal heart sounds. No murmur heard. Pulmonary:      Effort: Pulmonary effort is normal. No respiratory distress. Breath sounds: Normal breath sounds. No wheezing. Chest:      Chest wall: No tenderness. Abdominal:      General: Bowel sounds are normal. There is no distension. Palpations: Abdomen is soft. There is no shifting dullness, fluid wave, hepatomegaly, splenomegaly, mass or pulsatile mass. Tenderness: There is abdominal tenderness in the periumbilical area. There is no right CVA tenderness, left CVA tenderness, guarding or rebound. Negative signs include Vega's sign, Rovsing's sign, McBurney's sign, psoas sign and obturator sign. Hernia: No hernia is present. There is no hernia in the umbilical area, ventral area, left inguinal area, right femoral area, left femoral area or right inguinal area. Musculoskeletal:         General: No swelling or tenderness. Normal range of motion. Cervical back: Normal range of motion. No rigidity or tenderness. Right lower leg: No edema. Left lower leg: No edema. Lymphadenopathy:      Cervical: No cervical adenopathy. Skin:     General: Skin is warm.       Capillary Refill: Capillary refill takes less than 2 seconds. Coloration: Skin is not jaundiced. Findings: No bruising, erythema or rash. Neurological:      General: No focal deficit present. Mental Status: He is alert and oriented to person, place, and time. Cranial Nerves: No cranial nerve deficit. Sensory: No sensory deficit. Motor: No weakness. Coordination: Coordination normal.      Gait: Gait normal.   Psychiatric:         Mood and Affect: Mood normal.         Behavior: Behavior normal.         Thought Content:  Thought content normal.       [address]

## 2023-11-24 NOTE — PATIENT INSTRUCTIONS
Diet for Stomach Ulcers and Gastritis   WHAT YOU NEED TO KNOW:   A diet for stomach ulcers and gastritis is a meal plan that limits foods that irritate your stomach. Certain foods may worsen symptoms such as stomach pain, bloating, heartburn, or indigestion. DISCHARGE INSTRUCTIONS:   Foods to limit or avoid:  You may need to avoid acidic, spicy, or high-fat foods. Not all foods affect everyone the same way. You will need to learn which foods worsen your symptoms and limit those foods. The following are some foods that may worsen ulcer or gastritis symptoms:  Beverages:      Whole milk and chocolate milk    Hot cocoa and cola    Any beverage with caffeine    Regular and decaffeinated coffee    Peppermint and spearmint tea    Green and black tea, with or without caffeine    Orange and grapefruit juices    Drinks that contain alcohol    Spices and seasonings:      Black and red pepper    Chili powder    Mustard seed and nutmeg    Other foods:      Dairy foods made from whole milk or cream    Chocolate    Spicy or strongly flavored cheeses, such as jalapeno or black pepper    Highly seasoned, high-fat meats, such as sausage, salami, higuera, ham, and cold cuts    Hot chiles and peppers    Tomato products, such as tomato paste, tomato sauce, or tomato juice    Foods to include:  Eat a variety of healthy foods from all the food groups. Eat fruits, vegetables, whole grains, and fat-free or low-fat dairy foods. Whole grains include whole-wheat breads, cereals, pasta, and brown rice. Choose lean meats, poultry (chicken and turkey), fish, beans, eggs, and nuts. A healthy meal plan is low in unhealthy fats, salt, and added sugar. Healthy fats include olive oil and canola oil. Ask your dietitian for more information about a healthy diet. Other helpful guidelines:   Do not eat right before bedtime. Stop eating at least 2 hours before bedtime. Eat small, frequent meals.   Your stomach may tolerate small, frequent meals better than large meals. © Copyright Formerly named [facility] 2023 Information is for End User's use only and may not be sold, redistributed or otherwise used for commercial purposes. The above information is an  only. It is not intended as medical advice for individual conditions or treatments. Talk to your doctor, nurse or pharmacist before following any medical regimen to see if it is safe and effective for you.

## 2023-11-24 NOTE — ASSESSMENT & PLAN NOTE
Per recommendation from EGD 10/20/23 discussed weaning PPI, will send script for Famotidine 20 mg bid. Education on [facility] provided on AVS pt will trial taking omeprazole qod as has a lot of medication at home then switch to Famotidine.

## 2023-11-26 DIAGNOSIS — K21.9 GASTROESOPHAGEAL REFLUX DISEASE WITHOUT ESOPHAGITIS: ICD-10-CM

## 2023-11-27 RX ORDER — OMEPRAZOLE 40 MG/1
40 CAPSULE, DELAYED RELEASE ORAL DAILY
Qty: 90 CAPSULE | Refills: 1 | Status: SHIPPED | OUTPATIENT
Start: 2023-11-27

## 2023-12-01 ENCOUNTER — HOSPITAL ENCOUNTER (OUTPATIENT)
Dept: ULTRASOUND IMAGING | Facility: HOSPITAL | Age: 58
End: 2023-12-01
Payer: COMMERCIAL

## 2023-12-01 DIAGNOSIS — R10.33 PERIUMBILICAL ABDOMINAL PAIN: ICD-10-CM

## 2023-12-01 PROCEDURE — 76705 ECHO EXAM OF ABDOMEN: CPT

## 2023-12-15 ENCOUNTER — TELEPHONE (OUTPATIENT)
Age: 58
End: 2023-12-15

## 2023-12-18 ENCOUNTER — OFFICE VISIT (OUTPATIENT)
Dept: FAMILY MEDICINE CLINIC | Facility: CLINIC | Age: 58
End: 2023-12-18
Payer: COMMERCIAL

## 2023-12-18 VITALS
BODY MASS INDEX: 26.68 KG/M2 | DIASTOLIC BLOOD PRESSURE: 90 MMHG | WEIGHT: 166 LBS | SYSTOLIC BLOOD PRESSURE: 150 MMHG | RESPIRATION RATE: 16 BRPM | HEIGHT: 66 IN | TEMPERATURE: 98 F | HEART RATE: 73 BPM | OXYGEN SATURATION: 98 %

## 2023-12-18 DIAGNOSIS — E89.0 POSTOPERATIVE HYPOTHYROIDISM: ICD-10-CM

## 2023-12-18 DIAGNOSIS — E78.5 DYSLIPIDEMIA: ICD-10-CM

## 2023-12-18 DIAGNOSIS — I10 PRIMARY HYPERTENSION: ICD-10-CM

## 2023-12-18 DIAGNOSIS — Z12.5 SCREENING FOR PROSTATE CANCER: ICD-10-CM

## 2023-12-18 DIAGNOSIS — K21.9 GASTROESOPHAGEAL REFLUX DISEASE WITHOUT ESOPHAGITIS: Primary | ICD-10-CM

## 2023-12-18 PROBLEM — R10.33 PERIUMBILICAL ABDOMINAL PAIN: Status: RESOLVED | Noted: 2023-11-24 | Resolved: 2023-12-18

## 2023-12-18 PROCEDURE — 99214 OFFICE O/P EST MOD 30 MIN: CPT | Performed by: FAMILY MEDICINE

## 2023-12-18 RX ORDER — FAMOTIDINE 40 MG/1
40 TABLET, FILM COATED ORAL DAILY
Qty: 30 TABLET | Refills: 3 | Status: SHIPPED | OUTPATIENT
Start: 2023-12-18

## 2023-12-18 NOTE — PROGRESS NOTES
Name: Trino Dominguez      : 1965      MRN: [id number]  Encounter Provider: Yue Sol MD  Encounter Date: 2023   Encounter department: BMI Counseling: Body mass index is 26.79 kg/m². The BMI is above normal. Nutrition recommendations include 3-5 servings of fruits/vegetables daily, reducing fast food intake, consuming healthier snacks, decreasing soda and/or juice intake, moderation in carbohydrate intake, and increasing intake of lean protein. Exercise recommendations include exercising 3-5 times per week and strength training exercises.    Assessment & Plan     1. Gastroesophageal reflux disease without esophagitis  Assessment & Plan:  Will start pepcid    Orders:  -     famotidine (PEPCID) 40 MG tablet; Take 1 tablet (40 mg total) by mouth daily    2. Postoperative hypothyroidism  Assessment & Plan:  Needs TSH    Orders:  -     TSH, 3rd generation; Future    3. Primary hypertension  Assessment & Plan:  Did not take his bp meds for 2 days advised on need for compliance    Orders:  -     Comprehensive metabolic panel; Future; Expected date: 2023    4. Dyslipidemia  -     Lipid panel; Future; Expected date: 2023    5. Screening for prostate cancer  -     PSA, Total Screen; Future           Subjective      Heartburn  He complains of heartburn. He reports no abdominal pain, no chest pain, no coughing or no nausea. Pertinent negatives include no fatigue.    pt here with complaints of heartburn has been off omeprazole but didn't start pepcid since not in pharmacy no abd pain just burning in his stomach had endoscopy with mild inflammation  Review of Systems   Constitutional:  Negative for appetite change, chills, fatigue and fever.   Respiratory:  Negative for cough, chest tightness and shortness of breath.    Cardiovascular:  Negative for chest pain, palpitations and leg swelling.   Gastrointestinal:  Positive for heartburn. Negative for abdominal pain, constipation, diarrhea, nausea and  "vomiting.        Heartburn   Genitourinary:  Negative for difficulty urinating and frequency.   Musculoskeletal:  Negative for arthralgias, back pain, gait problem and neck pain.   Skin:  Negative for rash.   Neurological:  Positive for dizziness, weakness, light-headedness, numbness and headaches.   Hematological:  Bruises/bleeds easily.   Psychiatric/Behavioral:  Negative for dysphoric mood and sleep disturbance. The patient is not nervous/anxious.        Current Outpatient Medications on File Prior to Visit   Medication Sig   • amLODIPine (NORVASC) 5 mg tablet Take 1 tablet (5 mg total) by mouth daily   • Cholecalciferol 10 MCG (400 UNIT) CAPS Take 400 Units by mouth daily   • cyclobenzaprine (FLEXERIL) 10 mg tablet Take 1 tablet (10 mg total) by mouth daily at bedtime   • levothyroxine 88 mcg tablet    • meloxicam (MOBIC) 15 mg tablet Take 1 tablet (15 mg total) by mouth daily as needed for moderate pain   • triamcinolone (KENALOG) 0.5 % cream APPLY TO AFFECTED AREA TWICE A DAY   • [DISCONTINUED] famotidine (PEPCID) 20 mg tablet Take 1 tablet (20 mg total) by mouth 2 (two) times a day   • [DISCONTINUED] omeprazole (PriLOSEC) 40 MG capsule TAKE 1 CAPSULE BY MOUTH DAILY (Patient not taking: Reported on 12/18/2023)       Objective     /90 (BP Location: Left arm, Patient Position: Sitting, Cuff Size: Standard)   Pulse 73   Temp 98 °F (36.7 °C) (Tympanic)   Resp 16   Ht 5' 6\" (1.676 m)   Wt 75.3 kg (166 lb)   SpO2 98%   BMI 26.79 kg/m²     Physical Exam  Vitals reviewed.   Constitutional:       General: He is not in acute distress.     Appearance: Normal appearance. He is well-developed. He is not ill-appearing.   HENT:      Mouth/Throat:      Mouth: Mucous membranes are moist.   Eyes:      Extraocular Movements: Extraocular movements intact.      Conjunctiva/sclera: Conjunctivae normal.      Pupils: Pupils are equal, round, and reactive to light.   Neck:      Thyroid: No thyromegaly.      Vascular: No " carotid bruit.   Cardiovascular:      Rate and Rhythm: Normal rate and regular rhythm.      Pulses: Normal pulses.      Heart sounds: Normal heart sounds. No murmur heard.  Pulmonary:      Effort: Pulmonary effort is normal.      Breath sounds: Normal breath sounds.   Chest:      Chest wall: No tenderness.   Abdominal:      General: Bowel sounds are normal. There is no distension.      Palpations: Abdomen is soft.      Tenderness: There is no abdominal tenderness.   Musculoskeletal:      Cervical back: Normal range of motion and neck supple.   Lymphadenopathy:      Cervical: No cervical adenopathy.   Skin:     General: Skin is warm and dry.   Neurological:      General: No focal deficit present.      Mental Status: He is alert and oriented to person, place, and time. Mental status is at baseline.      Cranial Nerves: No cranial nerve deficit.   Psychiatric:         Mood and Affect: Mood normal.         Behavior: Behavior normal.       Yue Sol MD

## 2024-01-09 DIAGNOSIS — K21.9 GASTROESOPHAGEAL REFLUX DISEASE WITHOUT ESOPHAGITIS: ICD-10-CM

## 2024-01-09 RX ORDER — FAMOTIDINE 40 MG/1
40 TABLET, FILM COATED ORAL DAILY
Qty: 90 TABLET | Refills: 1 | Status: SHIPPED | OUTPATIENT
Start: 2024-01-09 | End: 2024-01-15 | Stop reason: SDUPTHER

## 2024-01-13 LAB
ALBUMIN SERPL-MCNC: 4.3 G/DL (ref 3.8–4.9)
ALBUMIN/GLOB SERPL: 1.6 {RATIO} (ref 1.2–2.2)
ALP SERPL-CCNC: 77 IU/L (ref 44–121)
ALT SERPL-CCNC: 19 IU/L (ref 0–44)
AST SERPL-CCNC: 26 IU/L (ref 0–40)
BILIRUB SERPL-MCNC: 0.4 MG/DL (ref 0–1.2)
BUN SERPL-MCNC: 9 MG/DL (ref 8–27)
BUN/CREAT SERPL: 10 (ref 10–24)
CALCIUM SERPL-MCNC: 9.4 MG/DL (ref 8.6–10.2)
CHLORIDE SERPL-SCNC: 102 MMOL/L (ref 96–106)
CHOLEST SERPL-MCNC: 172 MG/DL (ref 100–199)
CO2 SERPL-SCNC: 25 MMOL/L (ref 20–29)
CREAT SERPL-MCNC: 0.88 MG/DL (ref 0.76–1.27)
EGFR: 98 ML/MIN/1.73
GLOBULIN SER-MCNC: 2.7 G/DL (ref 1.5–4.5)
GLUCOSE SERPL-MCNC: 82 MG/DL (ref 70–99)
HDLC SERPL-MCNC: 51 MG/DL
LDLC SERPL CALC-MCNC: 110 MG/DL (ref 0–99)
POTASSIUM SERPL-SCNC: 4 MMOL/L (ref 3.5–5.2)
PROT SERPL-MCNC: 7 G/DL (ref 6–8.5)
PSA SERPL-MCNC: 2.3 NG/ML (ref 0–4)
SL AMB VLDL CHOLESTEROL CALC: 11 MG/DL (ref 5–40)
SODIUM SERPL-SCNC: 140 MMOL/L (ref 134–144)
TRIGL SERPL-MCNC: 58 MG/DL (ref 0–149)
TSH SERPL DL<=0.005 MIU/L-ACNC: 15.8 UIU/ML (ref 0.45–4.5)

## 2024-01-15 DIAGNOSIS — E03.8 OTHER SPECIFIED HYPOTHYROIDISM: Primary | ICD-10-CM

## 2024-01-15 DIAGNOSIS — K21.9 GASTROESOPHAGEAL REFLUX DISEASE WITHOUT ESOPHAGITIS: ICD-10-CM

## 2024-01-15 RX ORDER — FAMOTIDINE 40 MG/1
40 TABLET, FILM COATED ORAL DAILY
Qty: 90 TABLET | Refills: 1 | Status: SHIPPED | OUTPATIENT
Start: 2024-01-15 | End: 2024-01-22 | Stop reason: SDUPTHER

## 2024-01-15 RX ORDER — LEVOTHYROXINE SODIUM 0.1 MG/1
100 TABLET ORAL DAILY
Qty: 90 TABLET | Refills: 1 | Status: SHIPPED | OUTPATIENT
Start: 2024-01-15

## 2024-01-17 ENCOUNTER — RA CDI HCC (OUTPATIENT)
Dept: OTHER | Facility: HOSPITAL | Age: 59
End: 2024-01-17

## 2024-01-17 NOTE — PROGRESS NOTES
HCC coding opportunities       Chart reviewed, no opportunity found: CHART REVIEWED, NO OPPORTUNITY FOUND        Patients Insurance        Commercial Insurance: [employer]

## 2024-01-22 DIAGNOSIS — K21.9 GASTROESOPHAGEAL REFLUX DISEASE WITHOUT ESOPHAGITIS: ICD-10-CM

## 2024-01-22 RX ORDER — FAMOTIDINE 40 MG/1
40 TABLET, FILM COATED ORAL DAILY
Qty: 30 TABLET | Refills: 0 | Status: SHIPPED | OUTPATIENT
Start: 2024-01-22

## 2024-02-16 PROBLEM — Z12.5 SCREENING FOR PROSTATE CANCER: Status: RESOLVED | Noted: 2023-12-18 | Resolved: 2024-02-16

## 2024-02-18 DIAGNOSIS — K21.9 GASTROESOPHAGEAL REFLUX DISEASE WITHOUT ESOPHAGITIS: ICD-10-CM

## 2024-02-18 RX ORDER — FAMOTIDINE 40 MG/1
40 TABLET, FILM COATED ORAL DAILY
Qty: 90 TABLET | Refills: 1 | Status: SHIPPED | OUTPATIENT
Start: 2024-02-18

## 2024-04-05 ENCOUNTER — TELEPHONE (OUTPATIENT)
Dept: GASTROENTEROLOGY | Facility: CLINIC | Age: 59
End: 2024-04-05

## 2024-04-05 ENCOUNTER — OFFICE VISIT (OUTPATIENT)
Dept: FAMILY MEDICINE CLINIC | Facility: CLINIC | Age: 59
End: 2024-04-05
Payer: COMMERCIAL

## 2024-04-05 VITALS
SYSTOLIC BLOOD PRESSURE: 144 MMHG | TEMPERATURE: 97.8 F | BODY MASS INDEX: 26.47 KG/M2 | WEIGHT: 164 LBS | OXYGEN SATURATION: 100 % | HEART RATE: 88 BPM | DIASTOLIC BLOOD PRESSURE: 80 MMHG

## 2024-04-05 DIAGNOSIS — I10 PRIMARY HYPERTENSION: ICD-10-CM

## 2024-04-05 DIAGNOSIS — K21.9 GASTROESOPHAGEAL REFLUX DISEASE WITHOUT ESOPHAGITIS: Primary | ICD-10-CM

## 2024-04-05 PROCEDURE — 99214 OFFICE O/P EST MOD 30 MIN: CPT

## 2024-04-05 RX ORDER — PANTOPRAZOLE SODIUM 40 MG/1
40 TABLET, DELAYED RELEASE ORAL
Qty: 30 TABLET | Refills: 1 | Status: SHIPPED | OUTPATIENT
Start: 2024-04-05 | End: 2024-06-04

## 2024-04-05 RX ORDER — SUCRALFATE 1 G/1
1 TABLET ORAL 3 TIMES DAILY
Qty: 120 TABLET | Refills: 1 | Status: SHIPPED | OUTPATIENT
Start: 2024-04-05

## 2024-04-05 NOTE — PATIENT INSTRUCTIONS
Pantoprazole (By mouth)   Pantoprazole (pan-TOE-pra-zole)  Treats gastroesophageal reflux disease (GERD), a damaged esophagus, and conditions that cause your stomach to make too much acid, including Zollinger-Thorne syndrome. This medicine is a proton pump inhibitor (PPI).   Brand Name(s): Protonix   There may be other brand names for this medicine.  When This Medicine Should Not Be Used:   This medicine is not right for everyone. Do not use it if you had an allergic reaction to pantoprazole or similar medicines.  How to Use This Medicine:   Packet, Tablet, Delayed Release Tablet, Long Acting Tablet  Your doctor will tell you how much medicine to use. Do not use more than directed.  Delayed-release tablet: Swallow the tablet whole. Do not crush, break, or chew it.  Delayed-release packet: Take the medicine mixed in apple juice or applesauce at least 30 minutes before a meal. It may also be given using a nasogastric tube when mixed in apple juice only.  To prepare with applesauce:   Mix the packet contents with 1 teaspoon of applesauce. Do not mix with water or other liquids or food. Do not divide the packet contents to make smaller doses.  Swallow the mixture within 10 minutes after you mix it. Do not chew or crush the granules.  Sip some water after you take the mixture to make sure you swallow all of the medicine.  To prepare with apple juice:   Mix the packet contents with 1 teaspoon of apple juice in a small cup. Do not divide the packet contents to make smaller doses.  Stir for 5 seconds and drink the mixture immediately. Do not chew or crush the granules.  To make sure you get all of the medicine, add more apple juice to the cup. Drink it immediately.  To prepare for a feeding tube:   Pour the packet contents in a 2-ounce (60 milliliter [mL]) catheter-tip syringe.  Add 10 mL of apple juice to the syringe. Add the mixture to the tube. Gently tap or shake the barrel of the syringe to help empty it.  Add 10 mL  of apple juice to the syringe and put it in the tube. Do this at least 2 times. There should be no granules left in the syringe.  This medicine should come with a Medication Guide. Ask your pharmacist for a copy if you do not have one.  Missed dose: Take a dose as soon as you remember. If it is almost time for your next dose, wait until then and take a regular dose. Do not take extra medicine to make up for a missed dose.  Store the medicine in a closed container at room temperature, away from heat, moisture, and direct light.  Drugs and Foods to Avoid:   Ask your doctor or pharmacist before using any other medicine, including over-the-counter medicines, vitamins, and herbal products.  Do not use pantoprazole together with medicine that contains rilpivirine.  Some medicines can affect how pantoprazole works. Tell your doctor if you are using any of the following:   Ampicillin, atazanavir, dasatinib, digoxin, erlotinib, itraconazole, ketoconazole, methotrexate, mycophenolate mofetil, nelfinavir, nilotinib, saquinavir  Blood thinner (including warfarin)  Diuretic (water pill)  Iron supplements  Warnings While Using This Medicine:   Tell your doctor if you are pregnant or breastfeeding, or if you have kidney disease, liver disease, lupus, or osteoporosis.  This medicine may cause the following problems:  Kidney problems, including acute tubulointerstitial nephritis  Increased risk of broken bones in the hip, wrist, or spine (more likely if used several times per day or longer than 1 year)  Serious skin reactions, including Pritchett-Zeyad syndrome, toxic epidermal necrolysis, acute generalized exanthematous pustulosis, and drug reaction with eosinophilia and systemic symptoms (DRESS)  Lupus  Fundic gland polyps (abnormal growth in the upper part of your stomach)  This medicine can cause diarrhea. Call your doctor if the diarrhea becomes severe, does not stop, or is bloody. Do not take any medicine to stop diarrhea  until you have talked to your doctor. Diarrhea can occur 2 months or more after you stop taking this medicine.  Tell any doctor or dentist who treats you that you are using this medicine. This medicine may affect certain medical test results.  Your doctor will do lab tests at regular visits to check on the effects of this medicine. Keep all appointments.  Keep all medicine out of the reach of children. Never share your medicine with anyone.  Possible Side Effects While Using This Medicine:   Call your doctor right away if you notice any of these side effects:  Allergic reaction: Itching or hives, swelling in your face or hands, swelling or tingling in your mouth or throat, chest tightness, trouble breathing  Blistering, peeling, red skin rash  Dry mouth, increased thirst, confusion, numbness and tingling around the mouth, fingertips, or feet  Fever, swelling in your body, unusual weight gain, change in how much or how often you urinate, blood in the urine  Joint pain, rash on your cheeks or arms that gets worse in the sun  Seizures, dizziness, fast or uneven heartbeat, muscle cramps or twitching  Severe diarrhea that does not go away, stomach cramps or pain, nausea, vomiting, loss of appetite, weight loss  Unusual bleeding, bruising, tiredness, or weakness  If you notice these less serious side effects, talk with your doctor:   Headache  Trouble having an erection  If you notice other side effects that you think are caused by this medicine, tell your doctor.   Call your doctor for medical advice about side effects. You may report side effects to FDA at 3-594-FDA-5466  © Copyright [employer] 2023 Information is for End User's use only and may not be sold, redistributed or otherwise used for commercial purposes.  The above information is an  only. It is not intended as medical advice for individual conditions or treatments. Talk to your doctor, nurse or pharmacist before following any medical regimen to  see if it is safe and effective for you.

## 2024-04-05 NOTE — TELEPHONE ENCOUNTER
Pt stopped in office due to heartburn issues. Please see note from 4/5/24. I gave him an appt for 7/10/24(earliest we had)with Willa. Pt was given carafate  and protonix from another provider . Can you please check with pt to see if he needs a sooner appt due to his symptoms ? I will put him on a wait list as well. Thanks Doris

## 2024-04-05 NOTE — PROGRESS NOTES
Name: Trino Dominguez      : 1965      MRN: [id number]  Encounter Provider: ANIBAL Ashby  Encounter Date: 2024   Encounter department: [facility]    Assessment & Plan     1. Gastroesophageal reflux disease without esophagitis  Assessment & Plan:  Pt with worsening esophagitis symptoms was previously on omeprazole 40 mg qd reports stopped secondary to throat  burning, tried taking famotidine 40 mg however medication is not effective. Pt had EGD 2023 showed Mild erythematous mucosa in the lower third of the esophagus.  Discussed importance of adhering to Gerd diet, start pantoprazole 40 mg qd, sucralfate TID and recommend GI f/u.    Orders:  -     pantoprazole (PROTONIX) 40 mg tablet; Take 1 tablet (40 mg total) by mouth daily before breakfast  -     sucralfate (CARAFATE) 1 g tablet; Take 1 tablet (1 g total) by mouth 3 (three) times a day    2. Primary hypertension  Assessment & Plan:  BP elevated in office pt did not take meds today. Counseled on medication adherence. Continue low Na diet and amlodipine 5 mg qd.             Subjective      Pt presents with c/o worsening heartburn. Pt has dx of Gerd was previously on omeprazole 40 mg qd however stopped medications states was causing throat to burn.       Review of Systems   Constitutional:  Negative for activity change, chills, fatigue and fever.   HENT:  Negative for congestion and postnasal drip.    Eyes:  Negative for discharge and visual disturbance.   Respiratory:  Negative for cough, chest tightness and shortness of breath.    Cardiovascular:  Negative for chest pain and palpitations.   Gastrointestinal:  Negative for abdominal distention, abdominal pain, diarrhea, nausea and vomiting.   Genitourinary:  Negative for decreased urine volume and difficulty urinating.   Skin:  Negative for color change.   Allergic/Immunologic: Negative for environmental allergies.   Neurological:  Negative for  dizziness, syncope, weakness and headaches.   Hematological:  Negative for adenopathy.   Psychiatric/Behavioral:  Negative for agitation and decreased concentration. The patient is not nervous/anxious.        Current Outpatient Medications on File Prior to Visit   Medication Sig    amLODIPine (NORVASC) 5 mg tablet Take 1 tablet (5 mg total) by mouth daily    Cholecalciferol 10 MCG (400 UNIT) CAPS Take 400 Units by mouth daily    cyclobenzaprine (FLEXERIL) 10 mg tablet Take 1 tablet (10 mg total) by mouth daily at bedtime    famotidine (PEPCID) 40 MG tablet TAKE 1 TABLET BY MOUTH EVERY DAY    levothyroxine 100 mcg tablet Take 1 tablet (100 mcg total) by mouth daily This is a new dose increase from 88mcg to 100mcg    triamcinolone (KENALOG) 0.5 % cream APPLY TO AFFECTED AREA TWICE A DAY    meloxicam (MOBIC) 15 mg tablet Take 1 tablet (15 mg total) by mouth daily as needed for moderate pain (Patient not taking: Reported on 4/5/2024)       Objective     /80 (BP Location: Right arm, Patient Position: Sitting, Cuff Size: Standard)   Pulse 88   Temp 97.8 °F (36.6 °C) (Tympanic)   Wt 74.4 kg (164 lb)   SpO2 100%   BMI 26.47 kg/m²     Physical Exam  Vitals and nursing note reviewed.   Constitutional:       General: He is not in acute distress.     Appearance: Normal appearance. He is not ill-appearing, toxic-appearing or diaphoretic.   HENT:      Head: Normocephalic and atraumatic.      Nose: Nose normal. No congestion.      Mouth/Throat:      Mouth: Mucous membranes are moist.      Pharynx: Oropharynx is clear.   Eyes:      Conjunctiva/sclera: Conjunctivae normal.      Pupils: Pupils are equal, round, and reactive to light.   Cardiovascular:      Rate and Rhythm: Normal rate and regular rhythm.      Pulses: Normal pulses.      Heart sounds: Normal heart sounds.   Pulmonary:      Effort: Pulmonary effort is normal. No respiratory distress.      Breath sounds: Normal breath sounds.   Abdominal:      General: Bowel  sounds are normal. There is no distension.      Palpations: There is no mass.      Tenderness: There is no abdominal tenderness. There is no right CVA tenderness, left CVA tenderness, guarding or rebound.      Hernia: No hernia is present.   Musculoskeletal:         General: No swelling. Normal range of motion.      Cervical back: Normal range of motion. No rigidity or tenderness.      Right lower leg: No edema.      Left lower leg: No edema.   Lymphadenopathy:      Cervical: No cervical adenopathy.   Skin:     General: Skin is warm.      Capillary Refill: Capillary refill takes less than 2 seconds.      Findings: No erythema.   Neurological:      Mental Status: He is alert and oriented to person, place, and time.      Motor: No weakness.   Psychiatric:         Mood and Affect: Mood normal.         Behavior: Behavior normal.       ANIBAL Ashby

## 2024-04-05 NOTE — ASSESSMENT & PLAN NOTE
BP elevated in office pt did not take meds today. Counseled on medication adherence. Continue low Na diet and amlodipine 5 mg qd.

## 2024-04-05 NOTE — ASSESSMENT & PLAN NOTE
Pt with worsening esophagitis symptoms was previously on omeprazole 40 mg qd reports stopped secondary to throat  burning, tried taking famotidine 40 mg however medication is not effective. Pt had EGD October 2023 showed Mild erythematous mucosa in the lower third of the esophagus.  Discussed importance of adhering to Gerd diet, start pantoprazole 40 mg qd, sucralfate TID and recommend GI f/u.

## 2024-04-09 NOTE — TELEPHONE ENCOUNTER
Called and spoke with patient.He states his heartburn is better since starting the new medications.He states he would like a sooner apt.Patient scheduled.

## 2024-04-26 ENCOUNTER — OFFICE VISIT (OUTPATIENT)
Dept: GASTROENTEROLOGY | Facility: CLINIC | Age: 59
End: 2024-04-26
Payer: COMMERCIAL

## 2024-04-26 VITALS
WEIGHT: 160 LBS | HEART RATE: 72 BPM | DIASTOLIC BLOOD PRESSURE: 87 MMHG | BODY MASS INDEX: 26.66 KG/M2 | SYSTOLIC BLOOD PRESSURE: 141 MMHG | HEIGHT: 65 IN

## 2024-04-26 DIAGNOSIS — Z86.010 HX OF COLONIC POLYPS: ICD-10-CM

## 2024-04-26 DIAGNOSIS — K21.9 GASTROESOPHAGEAL REFLUX DISEASE WITHOUT ESOPHAGITIS: Primary | ICD-10-CM

## 2024-04-26 DIAGNOSIS — Z12.11 SCREENING FOR COLON CANCER: ICD-10-CM

## 2024-04-26 PROCEDURE — 99214 OFFICE O/P EST MOD 30 MIN: CPT | Performed by: NURSE PRACTITIONER

## 2024-04-26 RX ORDER — PANTOPRAZOLE SODIUM 40 MG/1
40 TABLET, DELAYED RELEASE ORAL DAILY
Qty: 90 TABLET | Refills: 2 | Status: SHIPPED | OUTPATIENT
Start: 2024-04-26 | End: 2024-07-25

## 2024-04-26 RX ORDER — SUCRALFATE 1 G/1
1 TABLET ORAL
Qty: 270 TABLET | Refills: 2 | Status: SHIPPED | OUTPATIENT
Start: 2024-04-26 | End: 2024-07-25

## 2024-04-26 NOTE — PROGRESS NOTES
[facility] - Outpatient Follow-up Note  Trino Dominguez 58 y.o. male MRN: [id number]  Encounter: [id number]          ASSESSMENT AND PLAN:      1. Gastroesophageal reflux disease without esophagitis  Patient reports current symptoms of acid reflux and heartburn are controlled on medication of pantoprazole once appropriate.  Patient reports previously he tried to wean off of pantoprazole but had reoccurrence of heartburn.  Patient does follow antireflux diet. EGD done 11/1/2023 showed small type I hiatal hernia and mild erythematous mucosa in the lower third of the esophagus.  Biopsy showed no significant histopathological abnormalities.  No increase in eosinophils.   -Continue to follow antireflux diet and measures  - pantoprazole (PROTONIX) 40 mg tablet; Take 1 tablet (40 mg total) by mouth daily Take 1/2 hour prior to breakfast  Dispense: 90 tablet; Refill: 2  - sucralfate (CARAFATE) 1 g tablet; Take 1 tablet (1 g total) by mouth 3 (three) times a day before meals  Dispense: 270 tablet; Refill: 2    2. Hx of colonic polyps  History of colon polyps.  Colonoscopy done 11/1/2023 internal medium protrudingGrade 2 hemorrhoids.  No recurrent polyps seen.  -Surveillance colonoscopy due 11/2030      3. Screening for colon cancer  Up to date    Follow up 1 year    ______________________________________________________________________    SUBJECTIVE: This is a pleasant 58-year-old male who presents f patient reports that hide ollow-up after EGD and colonoscopy.  Results of EGD and colonoscopy and biopsies reviewed with patient.  Chronic currently well-controlled on pantoprazole and Carafate.  Patient attempted to wean off of PPI previously and had reoccurrence of heartburn.  Patient does follow antireflux diet and measures.  Patient denies nausea, vomiting, epigastric or abdominal pain.  Patient denies blood in stool, blood from rectal area, or black tarry stool.  Abdomen exam benign no abdominal  "tenderness or guarding.    EGD done 2023 showed small type I hiatal hernia and mild erythematous mucosa in the lower third of the esophagus.  Biopsy showed no significant histopathological abnormalities.  No increase in eosinophils.  Patient is a former smoker.  Patient drinks alcohol only on rare occasions.  Patient denies illicit drug use or marijuana use.  Colonoscopy done 2023 internal medium protrudingGrade 2 hemorrhoids.  No recurrent polyps seen.    REVIEW OF SYSTEMS IS OTHERWISE NEGATIVE.      Historical Information   Past Medical History:   Diagnosis Date    Colon polyp     Disease of thyroid gland     GERD (gastroesophageal reflux disease)     Hypertension     Stomach ulcer      Past Surgical History:   Procedure Laterality Date    COLONOSCOPY      EGD      EYE SURGERY      KNEE SURGERY Left      Social History   Social History     Substance and Sexual Activity   Alcohol Use Yes    Comment: rarely     Social History     Substance and Sexual Activity   Drug Use No     Social History     Tobacco Use   Smoking Status Former    Current packs/day: 0.00    Average packs/day: 0.5 packs/day for 10.0 years (5.0 ttl pk-yrs)    Types: Cigarettes    Start date:     Quit date:     Years since quittin.3   Smokeless Tobacco Never     Family History   Problem Relation Age of Onset    Cancer Mother        Meds/Allergies       Current Outpatient Medications:     amLODIPine (NORVASC) 5 mg tablet    ASPIRIN 81 PO    Cholecalciferol 10 MCG (400 UNIT) CAPS    levothyroxine 100 mcg tablet    pantoprazole (PROTONIX) 40 mg tablet    sucralfate (CARAFATE) 1 g tablet    triamcinolone (KENALOG) 0.5 % cream    cyclobenzaprine (FLEXERIL) 10 mg tablet    meloxicam (MOBIC) 15 mg tablet    No Known Allergies        Objective     Blood pressure 141/87, pulse 72, height 5' 5\" (1.651 m), weight 72.6 kg (160 lb). Body mass index is 26.63 kg/m².      PHYSICAL EXAM:      General Appearance:   Alert, cooperative, no " distress   HEENT:   Normocephalic, atraumatic, anicteric.     Neck:  Supple, symmetrical, trachea midline   Lungs:   Clear to auscultation bilaterally; no rales, rhonchi or wheezing; respirations unlabored    Heart::   Regular rate and rhythm; no murmur, rub, or gallop.   Abdomen:   Soft, non-tender, non-distended; normal bowel sounds; no masses, no organomegaly    Genitalia:   Deferred    Rectal:   Deferred    Extremities:  No cyanosis, clubbing or edema    Pulses:  2+ and symmetric    Skin:  No jaundice, rashes, or lesions    Lymph nodes:  No palpable cervical lymphadenopathy        Lab Results:   No visits with results within 1 Day(s) from this visit.   Latest known visit with results is:   Orders Only on 01/12/2024   Component Date Value    Glucose, Random 01/12/2024 82     BUN 01/12/2024 9     Creatinine 01/12/2024 0.88     eGFR 01/12/2024 98     SL AMB BUN/CREATININE RA* 01/12/2024 10     Sodium 01/12/2024 140     Potassium 01/12/2024 4.0     Chloride 01/12/2024 102     CO2 01/12/2024 25     CALCIUM 01/12/2024 9.4     Protein, Total 01/12/2024 7.0     Albumin 01/12/2024 4.3     Globulin, Total 01/12/2024 2.7     Albumin/Globulin Ratio 01/12/2024 1.6     TOTAL BILIRUBIN 01/12/2024 0.4     Alk Phos Isoenzymes 01/12/2024 77     AST 01/12/2024 26     ALT 01/12/2024 19     Cholesterol, Total 01/12/2024 172     Triglycerides 01/12/2024 58     HDL 01/12/2024 51     VLDL Cholesterol Calcula* 01/12/2024 11     LDL Calculated 01/12/2024 110 (H)     TSH 01/12/2024 15.800 (H)     Prostate Specific Antige* 01/12/2024 2.3          Radiology Results:   No results found.

## 2024-05-06 ENCOUNTER — TELEPHONE (OUTPATIENT)
Age: 59
End: 2024-05-06

## 2024-05-06 NOTE — TELEPHONE ENCOUNTER
Pt has not received his medication, from [facility].   pantoprazole (PROTONIX , sucralfate (CARAFATE). Informed pt that the prescription was written and sent to [facility] on 4/26. Pt will call [facility]

## 2024-05-10 DIAGNOSIS — K21.9 GASTROESOPHAGEAL REFLUX DISEASE WITHOUT ESOPHAGITIS: ICD-10-CM

## 2024-05-10 RX ORDER — PANTOPRAZOLE SODIUM 40 MG/1
40 TABLET, DELAYED RELEASE ORAL DAILY
Qty: 7 TABLET | Refills: 0 | Status: CANCELLED | OUTPATIENT
Start: 2024-05-10 | End: 2024-08-08

## 2024-05-10 RX ORDER — PANTOPRAZOLE SODIUM 40 MG/1
40 TABLET, DELAYED RELEASE ORAL DAILY
Qty: 90 TABLET | Refills: 1 | Status: CANCELLED | OUTPATIENT
Start: 2024-05-10 | End: 2024-08-08

## 2024-05-10 RX ORDER — PANTOPRAZOLE SODIUM 40 MG/1
TABLET, DELAYED RELEASE ORAL
Qty: 7 TABLET | Refills: 0 | Status: SHIPPED | OUTPATIENT
Start: 2024-05-10

## 2024-05-10 NOTE — TELEPHONE ENCOUNTER
Patient is requesting a 7 day supply sent to local [facility] until Mail order comes.    Reason for call:   [x] Refill   [] Prior Auth  [] Other:     Office:   [] PCP/Provider -   [x] Specialty/Provider - Gastro    Medication:     pantoprazole (PROTONIX) 40 mg tablet       Dose/Frequency:     40 mg, Oral, Daily       Quantity:   90  7    Pharmacy:    [facility] - [place], [place] - [address] [place] [address]/pharmacy #[id number] - [place] - [address] [place] [address]     Does the patient have enough for 3 days?   [] Yes   [x] No - Send as HP to POD

## 2024-05-10 NOTE — TELEPHONE ENCOUNTER
Mail order was already sent on [id number] patient just needs short term sent until mail order comes. Short term qued up so that it doesn't cancel out the mail order

## 2024-05-26 PROBLEM — Z12.11 SCREENING FOR COLON CANCER: Status: RESOLVED | Noted: 2023-01-13 | Resolved: 2024-05-26

## 2024-05-27 DIAGNOSIS — E03.8 OTHER SPECIFIED HYPOTHYROIDISM: ICD-10-CM

## 2024-05-28 RX ORDER — LEVOTHYROXINE SODIUM 0.1 MG/1
100 TABLET ORAL DAILY
Qty: 90 TABLET | Refills: 1 | Status: SHIPPED | OUTPATIENT
Start: 2024-05-28

## 2024-08-14 ENCOUNTER — TELEPHONE (OUTPATIENT)
Age: 59
End: 2024-08-14

## 2024-08-14 DIAGNOSIS — I10 PRIMARY HYPERTENSION: ICD-10-CM

## 2024-08-14 RX ORDER — AMLODIPINE BESYLATE 5 MG/1
5 TABLET ORAL DAILY
Qty: 30 TABLET | Refills: 2 | Status: SHIPPED | OUTPATIENT
Start: 2024-08-14

## 2024-08-14 NOTE — TELEPHONE ENCOUNTER
[facility] called to request a refill for Norvasc. They state that the patient put in for a refill through them. They states the last prescribing DR was  ANIBAL Obregon . Please review request.

## 2024-08-26 ENCOUNTER — OFFICE VISIT (OUTPATIENT)
Dept: FAMILY MEDICINE CLINIC | Facility: CLINIC | Age: 59
End: 2024-08-26
Payer: COMMERCIAL

## 2024-08-26 VITALS
WEIGHT: 165 LBS | BODY MASS INDEX: 27.49 KG/M2 | SYSTOLIC BLOOD PRESSURE: 128 MMHG | OXYGEN SATURATION: 98 % | TEMPERATURE: 97.4 F | RESPIRATION RATE: 16 BRPM | HEART RATE: 86 BPM | DIASTOLIC BLOOD PRESSURE: 72 MMHG | HEIGHT: 65 IN

## 2024-08-26 DIAGNOSIS — L20.84 INTRINSIC ECZEMA: ICD-10-CM

## 2024-08-26 DIAGNOSIS — I10 PRIMARY HYPERTENSION: Primary | ICD-10-CM

## 2024-08-26 DIAGNOSIS — K21.9 GASTROESOPHAGEAL REFLUX DISEASE WITHOUT ESOPHAGITIS: ICD-10-CM

## 2024-08-26 DIAGNOSIS — E89.0 POSTOPERATIVE HYPOTHYROIDISM: ICD-10-CM

## 2024-08-26 PROBLEM — D64.9 ANEMIA, MILD: Status: RESOLVED | Noted: 2023-03-23 | Resolved: 2024-08-26

## 2024-08-26 PROBLEM — M54.50 LOW BACK PAIN: Status: RESOLVED | Noted: 2023-10-13 | Resolved: 2024-08-26

## 2024-08-26 PROBLEM — M75.82 TENDINITIS OF LEFT ROTATOR CUFF: Status: RESOLVED | Noted: 2023-01-13 | Resolved: 2024-08-26

## 2024-08-26 PROBLEM — M25.552 LEFT HIP PAIN: Status: RESOLVED | Noted: 2023-10-13 | Resolved: 2024-08-26

## 2024-08-26 PROBLEM — E78.5 DYSLIPIDEMIA: Status: RESOLVED | Noted: 2023-08-29 | Resolved: 2024-08-26

## 2024-08-26 PROCEDURE — 99396 PREV VISIT EST AGE 40-64: CPT | Performed by: FAMILY MEDICINE

## 2024-08-26 PROCEDURE — 99214 OFFICE O/P EST MOD 30 MIN: CPT | Performed by: FAMILY MEDICINE

## 2024-08-26 RX ORDER — AMLODIPINE BESYLATE 5 MG/1
5 TABLET ORAL DAILY
Qty: 90 TABLET | Refills: 3 | Status: SHIPPED | OUTPATIENT
Start: 2024-08-26

## 2024-08-26 NOTE — PROGRESS NOTES
Ambulatory Visit  Name: Trino Dominguez      : 1965      MRN: [id number]  Encounter Provider: Yue Sol MD  Encounter Date: 2024   Encounter department: [facility]    Assessment & Plan   1. Primary hypertension  Assessment & Plan:  Well controlled on current therapy continue with current medications and will reassess next visit    Orders:  -     amLODIPine (NORVASC) 5 mg tablet; Take 1 tablet (5 mg total) by mouth daily  2. Postoperative hypothyroidism  Assessment & Plan:  Pt did not have TSH repeated as ordered  needs to have this   3. Gastroesophageal reflux disease without esophagitis  Assessment & Plan:  Ok on med  pantoprazole stopped sucralfate   4. Intrinsic eczema  Assessment & Plan:  Triamcinolone prn        History of Present Illness     Patient here for annual physical/ Pt is here for interval visit and evaluation of multiple medical problems, review of medications, labs, Health Maintenance and any recent specialty consults            Review of Systems   Constitutional:  Negative for appetite change, chills, fatigue and fever.   Eyes:  Positive for visual disturbance (vision loss right eye).   Respiratory:  Negative for cough, chest tightness and shortness of breath.    Cardiovascular:  Negative for chest pain, palpitations and leg swelling.   Gastrointestinal:  Negative for abdominal pain, constipation, diarrhea, nausea and vomiting.   Genitourinary:  Negative for difficulty urinating and frequency.   Musculoskeletal:  Negative for arthralgias, back pain, gait problem and neck pain.   Skin:  Negative for rash.   Neurological:  Negative for dizziness, weakness, light-headedness, numbness and headaches.   Hematological:  Does not bruise/bleed easily.   Psychiatric/Behavioral:  Negative for dysphoric mood and sleep disturbance. The patient is not nervous/anxious.        Objective     /72   Pulse 86   Temp (!) 97.4 °F (36.3 °C) (Tympanic)   Resp 16   Ht  "5' 5\" (1.651 m)   Wt 74.8 kg (165 lb)   SpO2 98%   BMI 27.46 kg/m²     Physical Exam  Vitals and nursing note reviewed.   Constitutional:       General: He is not in acute distress.     Appearance: Normal appearance. He is well-developed. He is obese.   HENT:      Right Ear: Tympanic membrane and ear canal normal.      Left Ear: Tympanic membrane and ear canal normal.      Mouth/Throat:      Mouth: Mucous membranes are moist.   Eyes:      Conjunctiva/sclera: Conjunctivae normal.      Pupils: Pupils are equal, round, and reactive to light.      Comments: Dysconjugate since infancy   Neck:      Thyroid: No thyromegaly.      Vascular: No carotid bruit.   Cardiovascular:      Rate and Rhythm: Normal rate and regular rhythm.      Pulses: Normal pulses.      Heart sounds: Normal heart sounds. No murmur heard.  Pulmonary:      Effort: Pulmonary effort is normal. No respiratory distress.      Breath sounds: Normal breath sounds. No wheezing or rales.   Abdominal:      General: Bowel sounds are normal. There is no distension.      Palpations: Abdomen is soft. There is no mass.      Tenderness: There is no abdominal tenderness.      Hernia: No hernia is present.   Musculoskeletal:      Cervical back: Normal range of motion and neck supple.      Right lower leg: No edema.      Left lower leg: No edema.   Lymphadenopathy:      Cervical: No cervical adenopathy.   Skin:     General: Skin is warm and dry.      Capillary Refill: Capillary refill takes less than 2 seconds.      Findings: No rash.   Neurological:      General: No focal deficit present.      Mental Status: He is alert and oriented to person, place, and time.      Cranial Nerves: No cranial nerve deficit.      Sensory: No sensory deficit.      Motor: No weakness.      Coordination: Coordination normal.      Gait: Gait normal.      Deep Tendon Reflexes: Reflexes normal.   Psychiatric:         Mood and Affect: Mood normal.         Behavior: Behavior normal.         " Thought Content: Thought content normal.       Administrative Statements

## 2024-09-25 ENCOUNTER — OFFICE VISIT (OUTPATIENT)
Dept: FAMILY MEDICINE CLINIC | Facility: CLINIC | Age: 59
End: 2024-09-25
Payer: COMMERCIAL

## 2024-09-25 VITALS
BODY MASS INDEX: 27.49 KG/M2 | DIASTOLIC BLOOD PRESSURE: 82 MMHG | OXYGEN SATURATION: 99 % | WEIGHT: 165 LBS | HEIGHT: 65 IN | SYSTOLIC BLOOD PRESSURE: 124 MMHG | HEART RATE: 77 BPM | RESPIRATION RATE: 16 BRPM | TEMPERATURE: 98.3 F

## 2024-09-25 DIAGNOSIS — R10.33 UMBILICAL PAIN: ICD-10-CM

## 2024-09-25 DIAGNOSIS — Z23 IMMUNIZATION DUE: Primary | ICD-10-CM

## 2024-09-25 PROCEDURE — 99213 OFFICE O/P EST LOW 20 MIN: CPT | Performed by: FAMILY MEDICINE

## 2024-09-25 PROCEDURE — 90673 RIV3 VACCINE NO PRESERV IM: CPT | Performed by: FAMILY MEDICINE

## 2024-09-25 PROCEDURE — 90471 IMMUNIZATION ADMIN: CPT | Performed by: FAMILY MEDICINE

## 2024-09-25 NOTE — PROGRESS NOTES
"Ambulatory Visit  Name: Trino Dominguez      : 1965      MRN: [id number]  Encounter Provider: Yue Sol MD  Encounter Date: 2024   Encounter department: [facility]    Assessment & Plan  Immunization due    Orders:    influenza vaccine, recombinant, PF, 0.5 mL IM (Flublok)    Umbilical pain  Nl exam possible hernia send to surgery for eval    Orders:    Ambulatory Referral to General Surgery; Future       History of Present Illness     Pt with 2 weeks on off umbilical pain normal Bms no NVDC    Abdominal Pain          Review of Systems   Gastrointestinal:  Positive for abdominal pain (umbilical area).           Objective     /82 (BP Location: Left arm, Patient Position: Sitting, Cuff Size: Standard)   Pulse 77   Temp 98.3 °F (36.8 °C) (Tympanic)   Resp 16   Ht 5' 5\" (1.651 m)   Wt 74.8 kg (165 lb)   SpO2 99%   BMI 27.46 kg/m²     Physical Exam  Constitutional:       General: He is not in acute distress.     Appearance: Normal appearance. He is not ill-appearing.   Eyes:      Extraocular Movements: Extraocular movements intact.   Pulmonary:      Effort: Pulmonary effort is normal.   Abdominal:      General: There is no distension.      Palpations: There is no mass.      Tenderness: There is abdominal tenderness. There is guarding. There is no rebound.      Hernia: No hernia is present.   Musculoskeletal:      Cervical back: Normal range of motion.   Neurological:      General: No focal deficit present.      Mental Status: He is alert and oriented to person, place, and time.   Psychiatric:         Mood and Affect: Mood normal.         Behavior: Behavior normal.         "

## 2024-09-25 NOTE — ASSESSMENT & PLAN NOTE
Nl exam possible hernia send to surgery for eval    Orders:    Ambulatory Referral to General Surgery; Future

## 2024-10-09 ENCOUNTER — CONSULT (OUTPATIENT)
Dept: SURGERY | Facility: CLINIC | Age: 59
End: 2024-10-09
Payer: COMMERCIAL

## 2024-10-09 ENCOUNTER — APPOINTMENT (OUTPATIENT)
Dept: LAB | Facility: HOSPITAL | Age: 59
End: 2024-10-09
Attending: SURGERY
Payer: COMMERCIAL

## 2024-10-09 VITALS
SYSTOLIC BLOOD PRESSURE: 128 MMHG | OXYGEN SATURATION: 98 % | HEIGHT: 65 IN | BODY MASS INDEX: 27.19 KG/M2 | WEIGHT: 163.2 LBS | HEART RATE: 83 BPM | TEMPERATURE: 96.9 F | DIASTOLIC BLOOD PRESSURE: 78 MMHG

## 2024-10-09 DIAGNOSIS — Z01.812 PRE-OPERATIVE LABORATORY EXAMINATION: ICD-10-CM

## 2024-10-09 DIAGNOSIS — R10.33 UMBILICAL PAIN: ICD-10-CM

## 2024-10-09 DIAGNOSIS — E03.8 OTHER SPECIFIED HYPOTHYROIDISM: ICD-10-CM

## 2024-10-09 DIAGNOSIS — R10.33 PERIUMBILICAL PAIN: Primary | ICD-10-CM

## 2024-10-09 LAB
ALBUMIN SERPL BCG-MCNC: 4.1 G/DL (ref 3.5–5)
ALP SERPL-CCNC: 60 U/L (ref 34–104)
ALT SERPL W P-5'-P-CCNC: 15 U/L (ref 7–52)
ANION GAP SERPL CALCULATED.3IONS-SCNC: 7 MMOL/L (ref 4–13)
AST SERPL W P-5'-P-CCNC: 20 U/L (ref 13–39)
BILIRUB SERPL-MCNC: 0.39 MG/DL (ref 0.2–1)
BUN SERPL-MCNC: 13 MG/DL (ref 5–25)
CALCIUM SERPL-MCNC: 9.1 MG/DL (ref 8.4–10.2)
CHLORIDE SERPL-SCNC: 103 MMOL/L (ref 96–108)
CO2 SERPL-SCNC: 31 MMOL/L (ref 21–32)
CREAT SERPL-MCNC: 0.95 MG/DL (ref 0.6–1.3)
GFR SERPL CREATININE-BSD FRML MDRD: 86 ML/MIN/1.73SQ M
GLUCOSE SERPL-MCNC: 85 MG/DL (ref 65–140)
POTASSIUM SERPL-SCNC: 3.9 MMOL/L (ref 3.5–5.3)
PROT SERPL-MCNC: 7.1 G/DL (ref 6.4–8.4)
SODIUM SERPL-SCNC: 141 MMOL/L (ref 135–147)
TSH SERPL DL<=0.05 MIU/L-ACNC: 0.97 UIU/ML (ref 0.45–4.5)

## 2024-10-09 PROCEDURE — 80053 COMPREHEN METABOLIC PANEL: CPT

## 2024-10-09 PROCEDURE — 84443 ASSAY THYROID STIM HORMONE: CPT

## 2024-10-09 PROCEDURE — 99204 OFFICE O/P NEW MOD 45 MIN: CPT | Performed by: SURGERY

## 2024-10-09 PROCEDURE — 36415 COLL VENOUS BLD VENIPUNCTURE: CPT

## 2024-10-09 NOTE — PROGRESS NOTES
Ambulatory Visit  Name: Trino Dominguez      : 1965      MRN: [id number]  Encounter Provider: Dragan Man MD  Encounter Date: 10/9/2024   Encounter department: [facility] GENERAL SURGERY [place]    Assessment & Plan  Umbilical pain    Orders:    Ambulatory Referral to General Surgery    Periumbilical pain    Orders:    CT abdomen pelvis w contrast; Future  Does not have any palpable hernias.  He is EGD and colonoscopy did not show any cause of this pain.  An ultrasound of the abdomen done few months ago was also normal.  I am ordering a CT scan with oral and IV contrast.  Follow-up after that.    History of Present Illness     Trino Dominguez is a 59 y.o. male who presents with complaints of periumbilical pain.  He says it started 2 weeks ago.  There is no association with the heavy lifting, work.  No change in bowel movements.  No nausea or vomiting.  No change in weight.  He had similar pain few months ago which resolved spontaneously.  Patient has no prior history of abdominal surgery.  He has a history of thyroidectomy.    History obtained from : patient  Review of Systems   Constitutional: Negative.    HENT: Negative.     Eyes: Negative.    Respiratory: Negative.     Cardiovascular: Negative.    Gastrointestinal:  Positive for abdominal pain.   Endocrine: Negative.    Genitourinary: Negative.    Musculoskeletal: Negative.    Skin: Negative.    Allergic/Immunologic: Negative.    Neurological: Negative.    Hematological: Negative.    Psychiatric/Behavioral: Negative.       Medical History Reviewed by provider this encounter:  Tobacco  Allergies  Meds  Problems  Med Hx  Surg Hx  Fam Hx       Past Medical History   Past Medical History:   Diagnosis Date    Colon polyp     Disease of thyroid gland     GERD (gastroesophageal reflux disease)     Hypertension     Stomach ulcer      Past Surgical History:   Procedure Laterality Date    COLONOSCOPY      EGD      EYE SURGERY      KNEE SURGERY Left       Family History   Problem Relation Age of Onset    Cancer Mother      Current Outpatient Medications on File Prior to Visit   Medication Sig Dispense Refill    amLODIPine (NORVASC) 5 mg tablet Take 1 tablet (5 mg total) by mouth daily 90 tablet 3    ASPIRIN 81 PO Take by mouth      Cholecalciferol 10 MCG (400 UNIT) CAPS Take 400 Units by mouth daily      levothyroxine 100 mcg tablet TAKE 1 TABLET BY MOUTH DAILY 90 tablet 1    pantoprazole (PROTONIX) 40 mg tablet Take 1 tablet (40 mg total) by mouth daily Take 1/2 hour prior to breakfast 7 tablet 0    triamcinolone (KENALOG) 0.5 % cream APPLY TO AFFECTED AREA TWICE A DAY (Patient not taking: Reported on 10/9/2024) 30 g 0     No current facility-administered medications on file prior to visit.   No Known Allergies   Current Outpatient Medications on File Prior to Visit   Medication Sig Dispense Refill    amLODIPine (NORVASC) 5 mg tablet Take 1 tablet (5 mg total) by mouth daily 90 tablet 3    ASPIRIN 81 PO Take by mouth      Cholecalciferol 10 MCG (400 UNIT) CAPS Take 400 Units by mouth daily      levothyroxine 100 mcg tablet TAKE 1 TABLET BY MOUTH DAILY 90 tablet 1    pantoprazole (PROTONIX) 40 mg tablet Take 1 tablet (40 mg total) by mouth daily Take 1/2 hour prior to breakfast 7 tablet 0    triamcinolone (KENALOG) 0.5 % cream APPLY TO AFFECTED AREA TWICE A DAY (Patient not taking: Reported on 10/9/2024) 30 g 0     No current facility-administered medications on file prior to visit.      Social History     Tobacco Use    Smoking status: Former     Current packs/day: 0.00     Average packs/day: 0.5 packs/day for 10.0 years (5.0 ttl pk-yrs)     Types: Cigarettes     Start date:      Quit date: 2013     Years since quittin.7    Smokeless tobacco: Never   Vaping Use    Vaping status: Never Used   Substance and Sexual Activity    Alcohol use: Yes     Comment: rarely    Drug use: No    Sexual activity: Yes     Partners: Female     Birth control/protection:  "[employer] Male         Objective     /78 (BP Location: Right arm, Patient Position: Sitting, Cuff Size: Standard)   Pulse 83   Temp (!) 96.9 °F (36.1 °C) (Tympanic)   Ht 5' 5\" (1.651 m)   Wt 74 kg (163 lb 3.2 oz)   SpO2 98%   BMI 27.16 kg/m²     Physical Exam  Vitals reviewed.   Constitutional:       Appearance: Normal appearance.   HENT:      Head: Normocephalic.      Mouth/Throat:      Mouth: Mucous membranes are moist.   Eyes:      Pupils: Pupils are equal, round, and reactive to light.   Cardiovascular:      Rate and Rhythm: Normal rate and regular rhythm.      Pulses: Normal pulses.      Heart sounds: Normal heart sounds.   Pulmonary:      Effort: Pulmonary effort is normal.      Breath sounds: Normal breath sounds.   Abdominal:      General: Abdomen is flat. Bowel sounds are normal. There is no distension.      Palpations: Abdomen is soft. There is no mass.      Tenderness: There is no abdominal tenderness. There is no guarding or rebound.      Hernia: No hernia is present.   Musculoskeletal:         General: Normal range of motion.      Cervical back: Normal range of motion.   Skin:     General: Skin is warm.   Neurological:      General: No focal deficit present.      Mental Status: He is alert.       Administrative Statements   I have spent a total time of 30 minutes in caring for this patient on the day of the visit/encounter including Diagnostic results, Prognosis, Risks and benefits of tx options, Instructions for management, Patient and family education, Importance of tx compliance, Risk factor reductions, Impressions, Counseling / Coordination of care, Documenting in the medical record, Reviewing / ordering tests, medicine, procedures  , Obtaining or reviewing history  , and Communicating with other healthcare professionals .  "

## 2024-10-15 ENCOUNTER — HOSPITAL ENCOUNTER (OUTPATIENT)
Dept: CT IMAGING | Facility: HOSPITAL | Age: 59
Discharge: HOME | End: 2024-10-15
Attending: SURGERY
Payer: COMMERCIAL

## 2024-10-15 DIAGNOSIS — R10.33 PERIUMBILICAL PAIN: ICD-10-CM

## 2024-10-15 PROCEDURE — 74177 CT ABD & PELVIS W/CONTRAST: CPT

## 2024-10-15 RX ADMIN — IOHEXOL 50 ML: 350 INJECTION, SOLUTION INTRAVENOUS at 12:14

## 2024-10-23 DIAGNOSIS — E03.8 OTHER SPECIFIED HYPOTHYROIDISM: ICD-10-CM

## 2024-10-24 RX ORDER — LEVOTHYROXINE SODIUM 100 UG/1
100 TABLET ORAL DAILY
Qty: 90 TABLET | Refills: 1 | Status: SHIPPED | OUTPATIENT
Start: 2024-10-24

## 2024-10-28 ENCOUNTER — TELEPHONE (OUTPATIENT)
Age: 59
End: 2024-10-28

## 2024-10-28 NOTE — TELEPHONE ENCOUNTER
Patient calling had CT on 10/15/24 and has not heard anything about results, advised Dr. Sol is out this week, please review and let him know.

## 2024-10-29 NOTE — TELEPHONE ENCOUNTER
LVM to pt informing him that his CT results came back normal. Informed pt to contact office if he has any further questions or concerns.

## 2024-12-05 DIAGNOSIS — K21.9 GASTROESOPHAGEAL REFLUX DISEASE WITHOUT ESOPHAGITIS: ICD-10-CM

## 2024-12-06 RX ORDER — PANTOPRAZOLE SODIUM 40 MG/1
40 TABLET, DELAYED RELEASE ORAL
Qty: 90 TABLET | Refills: 1 | Status: SHIPPED | OUTPATIENT
Start: 2024-12-06

## 2024-12-12 ENCOUNTER — OFFICE VISIT (OUTPATIENT)
Dept: FAMILY MEDICINE CLINIC | Facility: CLINIC | Age: 59
End: 2024-12-12
Payer: COMMERCIAL

## 2024-12-12 VITALS
TEMPERATURE: 98 F | BODY MASS INDEX: 27.66 KG/M2 | SYSTOLIC BLOOD PRESSURE: 138 MMHG | WEIGHT: 166 LBS | HEIGHT: 65 IN | OXYGEN SATURATION: 99 % | RESPIRATION RATE: 16 BRPM | HEART RATE: 71 BPM | DIASTOLIC BLOOD PRESSURE: 82 MMHG

## 2024-12-12 DIAGNOSIS — L85.3 DRY SKIN: Primary | ICD-10-CM

## 2024-12-12 PROCEDURE — 99212 OFFICE O/P EST SF 10 MIN: CPT | Performed by: FAMILY MEDICINE

## 2024-12-12 NOTE — PROGRESS NOTES
"Name: Trino Dominguez      : 1965      MRN: [id number]  Encounter Provider: Yue Sol MD  Encounter Date: 2024   Encounter department: [facility]  :  Assessment & Plan  Dry skin  No rash visible \"winter itch\"  moisturizing soaps lotions oils               History of Present Illness     Pt with complaints of overall itching on off over body no rash       Review of Systems   Skin:         itching on off all over body        Objective   /82 (BP Location: Left arm, Patient Position: Sitting, Cuff Size: Large)   Pulse 71   Temp 98 °F (36.7 °C) (Tympanic)   Resp 16   Ht 5' 5\" (1.651 m)   Wt 75.3 kg (166 lb)   SpO2 99%   BMI 27.62 kg/m²      Physical Exam  Skin:     Findings: No rash.      Comments: Dry skin          "

## 2025-03-12 ENCOUNTER — OFFICE VISIT (OUTPATIENT)
Dept: FAMILY MEDICINE CLINIC | Facility: CLINIC | Age: 60
End: 2025-03-12
Payer: COMMERCIAL

## 2025-03-12 VITALS
TEMPERATURE: 98.3 F | RESPIRATION RATE: 18 BRPM | SYSTOLIC BLOOD PRESSURE: 128 MMHG | HEIGHT: 65 IN | OXYGEN SATURATION: 98 % | BODY MASS INDEX: 28.09 KG/M2 | WEIGHT: 168.6 LBS | HEART RATE: 90 BPM | DIASTOLIC BLOOD PRESSURE: 82 MMHG

## 2025-03-12 DIAGNOSIS — J02.9 ACUTE PHARYNGITIS, UNSPECIFIED ETIOLOGY: ICD-10-CM

## 2025-03-12 DIAGNOSIS — K21.9 LARYNGOPHARYNGEAL REFLUX: Primary | ICD-10-CM

## 2025-03-12 PROCEDURE — 99214 OFFICE O/P EST MOD 30 MIN: CPT | Performed by: FAMILY MEDICINE

## 2025-03-12 RX ORDER — AMOXICILLIN 875 MG/1
875 TABLET, COATED ORAL 2 TIMES DAILY
Qty: 14 TABLET | Refills: 0 | Status: SHIPPED | OUTPATIENT
Start: 2025-03-12 | End: 2025-03-19

## 2025-03-12 RX ORDER — FAMOTIDINE 40 MG/1
40 TABLET, FILM COATED ORAL
Qty: 30 TABLET | Refills: 0 | Status: SHIPPED | OUTPATIENT
Start: 2025-03-12

## 2025-03-12 NOTE — PROGRESS NOTES
Name: Trino Dominguez      : 1965     MRN: [id number]  Encounter Provider: Marylou Gabriel MD  Encounter Date: 3/12/2025  Encounter department: [facility]    Assessment & Plan  Laryngopharyngeal reflux  Discussed importance of diet and lifestyle modifications to control GERD/LPR symptoms.   Discussed the importance of eating small, frequent meals instead of large meals.   Patient  was counseled on  behavioral modification including avoiding spices, Caffeine, tomato gravy, fried and fatty food , dark chocolate,wine and other caffeinated drinks along with the last meal at least 3 hours from bedtime and do not lay down 2-3 hours following a meal.  Also counseled on maintaining the head elevated at 30 degree vimal when sleeping.   No red flag symptoms including hematemesis, abdominal pain or weight loss reported.   Continue pantoprazole 40 QD. Discussed increased risk of osteoporosis, C.difficile colitis in patients who are on PPI (Omeprazole) for longer duration. Advised taking adequate 1200 mg calcium and 400IU Vit D supplements daily.     Orders:    famotidine (PEPCID) 40 MG tablet; Take 1 tablet (40 mg total) by mouth daily at bedtime as needed for heartburn    Acute pharyngitis, unspecified etiology    Orders:    amoxicillin (AMOXIL) 875 mg tablet; Take 1 tablet (875 mg total) by mouth 2 (two) times a day for 7 days                   Read package inserts for all medications before starting a new medications, call me if you have any questions.    Patient was given opportunity to ask questions and all questions were answered.    Subjective:     Trino Dominguez is a 59 y.o. male.    Has burning in his throat for the past 2 days.  He has a known history of gastroesophageal reflux disease and is on pantoprazole however did eat spicy food 2 days ago    Sore Throat   Pertinent negatives include no abdominal pain, congestion, diarrhea, headaches or shortness of breath.       Past Medical  History:   Diagnosis Date    Colon polyp     Disease of thyroid gland     GERD (gastroesophageal reflux disease)     Hypertension     Stomach ulcer        Family History   Problem Relation Age of Onset    Cancer Mother        Past Surgical History:   Procedure Laterality Date    COLONOSCOPY      EGD      EYE SURGERY      KNEE SURGERY Left         reports that he quit smoking about 12 years ago. His smoking use included cigarettes. He started smoking about 22 years ago. He has a 5 pack-year smoking history. He has never used smokeless tobacco. He reports current alcohol use. He reports that he does not use drugs.      Current Outpatient Medications:     amLODIPine (NORVASC) 5 mg tablet, Take 1 tablet (5 mg total) by mouth daily, Disp: 90 tablet, Rfl: 3    amoxicillin (AMOXIL) 875 mg tablet, Take 1 tablet (875 mg total) by mouth 2 (two) times a day for 7 days, Disp: 14 tablet, Rfl: 0    ASPIRIN 81 PO, Take by mouth, Disp: , Rfl:     Cholecalciferol 10 MCG (400 UNIT) CAPS, Take 400 Units by mouth daily, Disp: , Rfl:     famotidine (PEPCID) 40 MG tablet, Take 1 tablet (40 mg total) by mouth daily at bedtime as needed for heartburn, Disp: 30 tablet, Rfl: 0    levothyroxine 100 mcg tablet, TAKE 1 TABLET BY MOUTH DAILY, Disp: 90 tablet, Rfl: 1    pantoprazole (PROTONIX) 40 mg tablet, TAKE 1 TABLET BY MOUTH DAILY 30  MINUTES BEFORE BREAKFAST, Disp: 90 tablet, Rfl: 1    triamcinolone (KENALOG) 0.5 % cream, APPLY TO AFFECTED AREA TWICE A DAY, Disp: 30 g, Rfl: 0    The following portions of the patient's history were reviewed and updated as appropriate: allergies, current medications, past family history, past medical history, past social history, past surgical history and problem list.    Review of Systems   Constitutional:  Negative for chills and fever.   HENT:  Positive for sore throat (States it is mainly burning and not painful). Negative for congestion and rhinorrhea.    Eyes:  Negative for discharge, redness and  "itching.   Respiratory:  Negative for chest tightness, shortness of breath and wheezing.    Cardiovascular:  Negative for chest pain and palpitations.   Gastrointestinal:  Negative for abdominal pain, constipation and diarrhea.   Genitourinary:  Negative for dysuria.   Skin:  Negative for pallor and rash.   Neurological:  Negative for dizziness, weakness, numbness and headaches.         PHQ-2/9 Depression Screening    Little interest or pleasure in doing things: 0 - not at all  Feeling down, depressed, or hopeless: 0 - not at all  PHQ-2 Score: 0  PHQ-2 Interpretation: Negative depression screen             Objective:    /82 (BP Location: Right arm, Patient Position: Sitting, Cuff Size: Adult)   Pulse 90   Temp 98.3 °F (36.8 °C) (Tympanic)   Resp 18   Ht 5' 5\" (1.651 m)   Wt 76.5 kg (168 lb 9.6 oz)   SpO2 98%   BMI 28.06 kg/m²      Physical Exam  Vitals and nursing note reviewed.   Constitutional:       Appearance: Normal appearance.   HENT:      Right Ear: External ear normal.      Left Ear: External ear normal.      Mouth/Throat:      Pharynx: Posterior oropharyngeal erythema present.   Eyes:      General:         Right eye: No discharge.         Left eye: No discharge.      Conjunctiva/sclera: Conjunctivae normal.   Cardiovascular:      Rate and Rhythm: Normal rate and regular rhythm.      Heart sounds: No murmur heard.  Pulmonary:      Effort: Pulmonary effort is normal.      Breath sounds: Normal breath sounds. No wheezing.   Abdominal:      General: There is no distension.      Palpations: Abdomen is soft.      Tenderness: There is no abdominal tenderness.   Musculoskeletal:      Right lower leg: No edema.      Left lower leg: No edema.   Skin:     Findings: No lesion or rash.   Neurological:      Mental Status: He is alert. Mental status is at baseline.   Psychiatric:         Mood and Affect: Mood normal.         Thought Content: Thought content normal.           Recent Results (from the past 52 " weeks)   Comprehensive metabolic panel    Collection Time: 10/09/24  4:49 PM   Result Value Ref Range    Sodium 141 135 - 147 mmol/L    Potassium 3.9 3.5 - 5.3 mmol/L    Chloride 103 96 - 108 mmol/L    CO2 31 21 - 32 mmol/L    ANION GAP 7 4 - 13 mmol/L    BUN 13 5 - 25 mg/dL    Creatinine 0.95 0.60 - 1.30 mg/dL    Glucose 85 65 - 140 mg/dL    Calcium 9.1 8.4 - 10.2 mg/dL    AST 20 13 - 39 U/L    ALT 15 7 - 52 U/L    Alkaline Phosphatase 60 34 - 104 U/L    Total Protein 7.1 6.4 - 8.4 g/dL    Albumin 4.1 3.5 - 5.0 g/dL    Total Bilirubin 0.39 0.20 - 1.00 mg/dL    eGFR 86 ml/min/1.73sq m   TSH, 3rd generation    Collection Time: 10/09/24  4:49 PM   Result Value Ref Range    TSH 3RD GENERATON 0.975 0.450 - 4.500 uIU/mL       Laboratory Results: I have personally reviewed the pertinent laboratory results/reports     Radiology/Other Diagnostic Testing Results: I have reviewed the following imaging and agree with the interpretation below.    CT abdomen pelvis w contrast  Result Date: 10/19/2024  CT ABDOMEN AND PELVIS WITH IV CONTRAST INDICATION: R10.33: Periumbilical pain. . COMPARISON: None. TECHNIQUE: CT examination of the abdomen and pelvis was performed. Multiplanar 2D reformatted images were created from the source data. This examination, like all CT scans performed in the [facility], was performed utilizing techniques to minimize radiation dose exposure, including the use of iterative reconstruction and automated exposure control. Radiation dose length product (DLP) for this visit: 486.71 mGy-cm IV Contrast: 50 mL of iohexol (OMNIPAQUE) Enteric Contrast: Administered. FINDINGS: ABDOMEN LOWER CHEST: No clinically significant abnormality in the visualized lower chest. LIVER/BILIARY TREE: Subcentimeter hypoattenuating lesion(s), too small to characterize but statistically likely benign, which do not require follow-up (ACR White Paper 2017). No suspicious mass. Normal hepatic contours. No biliary  "dilation. GALLBLADDER: No calcified gallstones. No pericholecystic inflammatory change. SPLEEN: A subcentimeter hypoattenuating lesion is too small to accurately characterize and likely represents a cyst or hemangioma. PANCREAS: Unremarkable. ADRENAL GLANDS: Unremarkable. KIDNEYS/URETERS: Unremarkable. No hydronephrosis. STOMACH AND BOWEL: Unremarkable. APPENDIX: Normal. ABDOMINOPELVIC CAVITY: No ascites. No pneumoperitoneum. No lymphadenopathy. VESSELS: Unremarkable for patient's age. PELVIS REPRODUCTIVE ORGANS: Unremarkable for patient's age. URINARY BLADDER: Unremarkable. ABDOMINAL WALL/INGUINAL REGIONS: Small bilateral fat-containing inguinal hernias. BONES: No acute fracture or suspicious osseous lesion. Spinal degenerative changes.     No acute findings in the abdomen or pelvis. Workstation performed: [id number]        Disclaimer: Portions of the record may have been created with voice recognition software. Occasional wrong word or \"sound a like\" substitutions may have occurred due to the inherent limitations of voice recognition software. Read the chart carefully and recognize, using context, where substitutions have occurred. I have used the Epic copy/forward function to compose this note. I have reviewed my current note to ensure it reflects the current patient status, exam, assessment and plan.  "

## 2025-03-12 NOTE — PATIENT INSTRUCTIONS
Discussed the importance of eating small, frequent meals instead of large meals.   Patient  was counseled on  behavioral modification including avoiding spices, Caffeine, tomato gravy,orange juice, citric fruits, fried and fatty food , dark chocolate,wine and other caffeinated drinks along with the last meal at least 3 hours from bedtime and do not lay down 2-3 hours following a meal.

## 2025-03-25 DIAGNOSIS — E03.8 OTHER SPECIFIED HYPOTHYROIDISM: ICD-10-CM

## 2025-03-26 RX ORDER — LEVOTHYROXINE SODIUM 100 UG/1
100 TABLET ORAL DAILY
Qty: 90 TABLET | Refills: 1 | Status: SHIPPED | OUTPATIENT
Start: 2025-03-26

## 2025-04-03 DIAGNOSIS — K21.9 LARYNGOPHARYNGEAL REFLUX: ICD-10-CM

## 2025-04-04 RX ORDER — FAMOTIDINE 40 MG/1
40 TABLET, FILM COATED ORAL
Qty: 90 TABLET | Refills: 1 | Status: SHIPPED | OUTPATIENT
Start: 2025-04-04

## 2025-04-08 ENCOUNTER — TELEPHONE (OUTPATIENT)
Dept: GASTROENTEROLOGY | Facility: CLINIC | Age: 60
End: 2025-04-08

## 2025-04-08 NOTE — TELEPHONE ENCOUNTER
Pt is due for a 1yr follow up office visit for medication refills (Pantoprazole).  Called and spoke to pt and scheduled.

## 2025-05-07 DIAGNOSIS — K21.9 GASTROESOPHAGEAL REFLUX DISEASE WITHOUT ESOPHAGITIS: ICD-10-CM

## 2025-05-08 RX ORDER — PANTOPRAZOLE SODIUM 40 MG/1
40 TABLET, DELAYED RELEASE ORAL
Qty: 90 TABLET | Refills: 0 | Status: SHIPPED | OUTPATIENT
Start: 2025-05-08

## 2025-05-28 ENCOUNTER — OFFICE VISIT (OUTPATIENT)
Dept: GASTROENTEROLOGY | Facility: CLINIC | Age: 60
End: 2025-05-28
Payer: COMMERCIAL

## 2025-05-28 VITALS
HEIGHT: 65 IN | DIASTOLIC BLOOD PRESSURE: 90 MMHG | SYSTOLIC BLOOD PRESSURE: 166 MMHG | WEIGHT: 168 LBS | BODY MASS INDEX: 27.99 KG/M2 | HEART RATE: 73 BPM

## 2025-05-28 DIAGNOSIS — Z86.0100 HX OF COLONIC POLYPS: ICD-10-CM

## 2025-05-28 DIAGNOSIS — K21.9 GASTROESOPHAGEAL REFLUX DISEASE WITHOUT ESOPHAGITIS: Primary | ICD-10-CM

## 2025-05-28 PROCEDURE — 99214 OFFICE O/P EST MOD 30 MIN: CPT | Performed by: NURSE PRACTITIONER

## 2025-05-28 RX ORDER — PANTOPRAZOLE SODIUM 40 MG/1
40 TABLET, DELAYED RELEASE ORAL EVERY OTHER DAY
Qty: 90 TABLET | Refills: 3 | Status: SHIPPED | OUTPATIENT
Start: 2025-05-28

## 2025-05-28 RX ORDER — PANTOPRAZOLE SODIUM 40 MG/1
40 TABLET, DELAYED RELEASE ORAL
Qty: 90 TABLET | Refills: 3 | Status: SHIPPED | OUTPATIENT
Start: 2025-05-28 | End: 2025-05-28

## 2025-05-28 NOTE — PATIENT INSTRUCTIONS
"Patient Education     Acid reflux and GERD in adults   The Basics   Written by the doctors and editors at [facility]   What is acid reflux? -- Acid reflux is when the acid that is normally in the stomach backs up into the esophagus (figure 1). The esophagus is the tube that carries food from the mouth to the stomach.  When acid reflux causes bothersome symptoms or damage, doctors call it \"gastroesophageal reflux disease\" (\"GERD\").  What are the symptoms of acid reflux? -- The most common symptoms are:   Heartburn, which is a burning feeling in the chest   Regurgitation, which is when acid and undigested food flow back into your throat or mouth  Other symptoms might include:   Stomach or chest pain   Trouble swallowing   Raspy voice or sore throat   Unexplained cough   Nausea or vomiting  Is there anything I can do on my own to feel better? -- Yes. You might feel better if you:   Lose weight (if you are overweight).   Raise the head of your bed by 6 to 8 inches - You can do this by putting blocks of wood or rubber under 2 legs of the bed or a foam wedge under the mattress. It is not enough to sleep with your head raised on pillows.   Avoid foods that make your symptoms worse - For some people, these include coffee, chocolate, alcohol, peppermint, and fatty foods. It might help to write down what you ate before having reflux. This can help you figure out if a food is causing your problem.   Stop smoking, if you smoke. Your doctor or nurse can help you try to quit.   Avoid late meals - Lying down with a full stomach can make reflux worse. Try to plan meals for at least 2 to 3 hours before bedtime.   Avoid tight clothing - Some people feel better if they wear comfortable clothing that does not squeeze the stomach area.  How is acid reflux treated? -- There are a few main types of medicines that can help with the symptoms of acid reflux. The most common are antacids, histamine blockers, and proton pump inhibitors (table " 25-Feb-2022 03:59 "1). All of these medicines work by reducing or blocking stomach acid. But they each do that in a different way.   For mild symptoms, antacids can help, but they work only for a short time. Histamine blockers are stronger and last longer than antacids. You can buy antacids and most histamine blockers without a prescription.   For frequent and more severe symptoms, proton pump inhibitors are the most effective medicines. Some of these medicines are sold without a prescription. But there are other versions that your doctor can prescribe.  Sometimes, medicines cost less if you get them with a doctor's prescription. Other times, non-prescription medicines cost less. If you are worried about cost, ask your pharmacist about ways to pay less for your medicines.  When should I call the doctor? -- While pain or burning in the chest can be a symptom of acid reflux, it can also be a symptom of something more serious like a heart problem. Call for emergency help right away (in the [place] and [place], call 9-1-1) if you think that you might be having a heart attack.  Symptoms of a heart attack might include:   Severe chest pain, pressure, or discomfort with:   Breathing trouble, sweating, upset stomach, or cold and clammy skin   Pain in your arms, back, or jaw   Worse pain with activity like walking up stairs   Fast or irregular heartbeat   Feeling dizzy, faint, or weak  Some people can manage their acid reflux on their own by changing their habits or taking non-prescription medicines. Call your doctor for advice if:   Your symptoms are severe or last a long time.   You cannot seem to control your symptoms.   You have had symptoms for many years.  You should also see a doctor or nurse right away if you:   Have trouble swallowing, or feel as though food gets \"stuck\" on the way down   Lose weight when you are not trying to   Have chest pain   Choke when you eat   Vomit blood, or have bowel movements that are red, black, or look like " "tar  What if my child or teen has acid reflux? -- If your child or teen has acid reflux, take them to see a doctor or nurse. Do not give your child medicines to treat acid reflux without talking to a doctor or nurse.  In children, acid reflux can be caused by a number of problems. Have a doctor or nurse check for these problems before trying any treatments.  All topics are updated as new evidence becomes available and our peer review process is complete.  This topic retrieved from [employer] on: Mar 29, 2024.  Topic 47475 Version 15.0  Release: 32.2.4 - C32.87  © 2024 UpToDate, Inc. and/or its affiliates. All rights reserved.  figure 1: Acid reflux     Acid reflux is when the acid that is normally in the stomach backs up into the esophagus. This can happen if a muscle called the \"lower esophageal sphincter\" relaxes too much.  Graphic 878335 Version 1.0  table 1: Medicines that reduce or block stomach acid  Medicine type  Medicine name examples    Antacids* Calcium carbonate (sample brand names: Maalox, Tums)    Aluminum hydroxide, magnesium hydroxide, and simethicone (sample brand name: Mylanta)   Surface agents Sucralfate (brand name: Carafate)   Histamine blockers¶  Famotidine (brand name: Pepcid)    Cimetidine (brand name: Tagamet)   Proton pump inhibitors Omeprazole (brand name: Prilosec)    Esomeprazole (brand name: Nexium)    Pantoprazole (brand name: Protonix)    Lansoprazole (brand name: Prevacid)    Dexlansoprazole (brand name: Dexilant)    Rabeprazole (brand name: AcipHex)   * Some antacids contain aspirin, which can increase the risk of internal bleeding. Examples of antacids with aspirin include Ila-[place], Medi-[place], and Neutralin. But there are others, too, so it's important to check labels. Talk to your doctor or nurse before taking any medicines that contain aspirin.  ¶ Another histamine blocker, ranitidine (brand name: Zantac), stopped being sold in 2020. That's because it was found to sometimes " contain a substance that could increase a person's risk of cancer if they took a lot of it over time. If you have any of this medicine in your home, stop taking it and throw away any that is left over. Ask your doctor or nurse about what other medicine you should use instead.  Graphic 64386 Version 15.0  Consumer Information Use and Disclaimer   Disclaimer: This generalized information is a limited summary of diagnosis, treatment, and/or medication information. It is not meant to be comprehensive and should be used as a tool to help the user understand and/or assess potential diagnostic and treatment options. It does NOT include all information about conditions, treatments, medications, side effects, or risks that may apply to a specific patient. It is not intended to be medical advice or a substitute for the medical advice, diagnosis, or treatment of a health care provider based on the health care provider's examination and assessment of a patient's specific and unique circumstances. Patients must speak with a health care provider for complete information about their health, medical questions, and treatment options, including any risks or benefits regarding use of medications. This information does not endorse any treatments or medications as safe, effective, or approved for treating a specific patient. UpToDate, Inc. and its affiliates disclaim any warranty or liability relating to this information or the use thereof.The use of this information is governed by the Terms of Use, available at https://www.woltersQuickSolaruwer.com/en/know/clinical-effectiveness-terms. 2024© UpToDate, Inc. and its affiliates and/or licensors. All rights reserved.  Copyright   © 2024 UpToDate, Inc. and/or its affiliates. All rights reserved.

## 2025-05-28 NOTE — PROGRESS NOTES
"Name: Trino Dominguez      : 1965      MRN: [id number]  Encounter Provider: ANIBAL Booker  Encounter Date: 2025   Encounter department: [facility] [place] [address]  :  Assessment & Plan  Gastroesophageal reflux disease without esophagitis  Last EGD was in 2023 with small type I hiatal hernia and mild erythematous mucosa in the lower third of the esophagus.  Continues to do well on pantoprazole 40 mg daily with no breakthrough symptoms, difficulty swallowing, nausea/vomiting or upper abdominal pain.  -Side effect profile of PPI discussed  -Will try to change to every other day dosing to see if this is as effective and then consider decreasing to 20 mg daily.    -He will continue antireflux diet/lifestyle measures in the interim.   - Follow-up in 1 year or sooner as needed    Orders:    pantoprazole (PROTONIX) 40 mg tablet; Take 1 tablet (40 mg total) by mouth every other day . Take 30 minutes prior to breakfast    Hx of colonic polyps  Up-to-date on colon cancer screening.  Colonoscopy due in 2030           History of Present Illness   Trino Dominguez is a 59 y.o. male who presents for follow up for med refills.  He has a history of GERD and small hiatal hernia currently doing well on pantoprazole 40 mg daily with no breakthrough symptoms, difficulty swallowing, painful swallowing, nausea or vomiting.  He is moving his bowels regularly, denies any rectal bleeding, changes in bowel habit, or unintentional weight loss.  He is up-to-date on colon cancer screening with with last colonoscopy in 2023 only notable for 3 medium protruding hemorrhoids.  He is due for repeat colonoscopy in 2030.  HPI    Review of Systems A complete review of systems is negative other than that noted above in the HPI.      Current Medications[1]  Objective   /90   Pulse 73   Ht 5' 5\" (1.651 m)   Wt 76.2 kg (168 lb)   BMI 27.96 kg/m²     Physical " Exam  Vitals and nursing note reviewed.   Constitutional:       General: He is not in acute distress.     Appearance: He is well-developed.   HENT:      Head: Normocephalic and atraumatic.     Eyes:      Conjunctiva/sclera: Conjunctivae normal.       Cardiovascular:      Rate and Rhythm: Normal rate and regular rhythm.      Heart sounds: No murmur heard.  Pulmonary:      Effort: Pulmonary effort is normal. No respiratory distress.      Breath sounds: Normal breath sounds.   Abdominal:      Palpations: Abdomen is soft.      Tenderness: There is no abdominal tenderness.     Musculoskeletal:         General: No swelling.      Cervical back: Neck supple.     Skin:     General: Skin is warm and dry.      Capillary Refill: Capillary refill takes less than 2 seconds.     Neurological:      Mental Status: He is alert and oriented to person, place, and time.     Psychiatric:         Mood and Affect: Mood normal.            Lab Results: I personally reviewed relevant lab results.       Results for orders placed during the hospital encounter of 11/01/23    EGD    Impression  Small type I hiatal hernia  Mild erythematous mucosa in the lower third of the esophagus      RECOMMENDATION:  Await pathology results  Follow up with PCP  Reflux precautions and PPI only as needed.  Try to taper PPI in the future              Abbey Hutchins MD             [1]   Current Outpatient Medications   Medication Sig Dispense Refill    amLODIPine (NORVASC) 5 mg tablet Take 1 tablet (5 mg total) by mouth daily 90 tablet 3    ASPIRIN 81 PO Take by mouth      levothyroxine 100 mcg tablet TAKE 1 TABLET BY MOUTH DAILY 90 tablet 1    pantoprazole (PROTONIX) 40 mg tablet Take 1 tablet (40 mg total) by mouth every other day . Take 30 minutes prior to breakfast 90 tablet 3    Cholecalciferol 10 MCG (400 UNIT) CAPS Take 400 Units by mouth daily (Patient not taking: Reported on 5/28/2025)      famotidine (PEPCID) 40 MG tablet TAKE 1 TABLET (40 MG TOTAL)  BY MOUTH DAILY AT BEDTIME AS NEEDED FOR HEARTBURN 90 tablet 1    triamcinolone (KENALOG) 0.5 % cream APPLY TO AFFECTED AREA TWICE A DAY 30 g 0     No current facility-administered medications for this visit.

## 2025-05-28 NOTE — ASSESSMENT & PLAN NOTE
Last EGD was in November 2023 with small type I hiatal hernia and mild erythematous mucosa in the lower third of the esophagus.  Continues to do well on pantoprazole 40 mg daily with no breakthrough symptoms, difficulty swallowing, nausea/vomiting or upper abdominal pain.  -Side effect profile of PPI discussed  -Will try to change to every other day dosing to see if this is as effective and then consider decreasing to 20 mg daily.    -He will continue antireflux diet/lifestyle measures in the interim.   - Follow-up in 1 year or sooner as needed    Orders:    pantoprazole (PROTONIX) 40 mg tablet; Take 1 tablet (40 mg total) by mouth every other day . Take 30 minutes prior to breakfast

## 2025-05-28 NOTE — LETTER
[facility] [place] [address]  [place] [id number]  [phone]  Dept: [phone]    May 28, 2025     Patient: Trino Dominguez   YOB: 1965   Date of Visit: 5/28/25       To Whom it May Concern:    Trino Dominguez is under my professional care. He was seen in the office 05/28/25. He may return to work on 5/29/25 without limitations.    If you have any questions or concerns, please don't hesitate to call.         Sincerely,          ANIBAL Booker

## 2025-08-21 ENCOUNTER — OFFICE VISIT (OUTPATIENT)
Dept: FAMILY MEDICINE CLINIC | Facility: CLINIC | Age: 60
End: 2025-08-21
Payer: COMMERCIAL

## 2025-08-21 VITALS
HEIGHT: 65 IN | OXYGEN SATURATION: 97 % | RESPIRATION RATE: 16 BRPM | HEART RATE: 86 BPM | BODY MASS INDEX: 27.16 KG/M2 | SYSTOLIC BLOOD PRESSURE: 122 MMHG | WEIGHT: 163 LBS | DIASTOLIC BLOOD PRESSURE: 78 MMHG | TEMPERATURE: 98 F

## 2025-08-21 DIAGNOSIS — M25.511 ACUTE PAIN OF RIGHT SHOULDER: Primary | ICD-10-CM

## 2025-08-21 PROCEDURE — 99213 OFFICE O/P EST LOW 20 MIN: CPT | Performed by: FAMILY MEDICINE

## 2025-08-21 RX ORDER — MELOXICAM 15 MG/1
15 TABLET ORAL DAILY PRN
Qty: 30 TABLET | Refills: 1 | Status: SHIPPED | OUTPATIENT
Start: 2025-08-21